# Patient Record
Sex: FEMALE | Race: WHITE | Employment: UNEMPLOYED | URBAN - METROPOLITAN AREA
[De-identification: names, ages, dates, MRNs, and addresses within clinical notes are randomized per-mention and may not be internally consistent; named-entity substitution may affect disease eponyms.]

---

## 2017-02-20 ENCOUNTER — OFFICE VISIT (OUTPATIENT)
Dept: ENDOCRINOLOGY | Age: 37
End: 2017-02-20

## 2017-02-20 VITALS
RESPIRATION RATE: 16 BRPM | DIASTOLIC BLOOD PRESSURE: 78 MMHG | SYSTOLIC BLOOD PRESSURE: 104 MMHG | BODY MASS INDEX: 20.91 KG/M2 | HEART RATE: 73 BPM | WEIGHT: 118 LBS | HEIGHT: 63 IN | OXYGEN SATURATION: 98 %

## 2017-02-20 DIAGNOSIS — Z86.32 HISTORY OF GESTATIONAL DIABETES: ICD-10-CM

## 2017-02-20 DIAGNOSIS — E06.3 HYPOTHYROIDISM DUE TO HASHIMOTO'S THYROIDITIS: ICD-10-CM

## 2017-02-20 DIAGNOSIS — L65.9 HAIR LOSS: Primary | ICD-10-CM

## 2017-02-20 DIAGNOSIS — E03.8 HYPOTHYROIDISM DUE TO HASHIMOTO'S THYROIDITIS: ICD-10-CM

## 2017-02-20 RX ORDER — CHOLECALCIFEROL (VITAMIN D3) 125 MCG
CAPSULE ORAL
COMMUNITY

## 2017-02-20 NOTE — PATIENT INSTRUCTIONS
I recommend researching a low-carbohydrate diet as this way of eating can help improve your blood sugars and also help you lose weight. It can also help decrease your needs for diabetes medications including insulin. Here are some resources you can use to educate yourself on the diet:  1. Diet Doctor Website:   PickSeatGRID/diabetes   https://[employer].InteKrin/  2. A Low Carbohydrate, Ketogenic Diet Manual by Anny Licea  3. New Atkins for a New You by Anny Licea and Cindy Escobedo  4. [email or web address]/blog/7-steps-to-healthy-low-carb-living/    ==================================================================     AIM FOR LESS THAN 20-30 GRAMS OF NET CARBS PER MEAL  Net carbs = total carbs (g) - fiber (g)  Read food labels! Avoid food with added sugar or anything with more than 5g sugar per serving. Focus on eating mostly protein (meat, poultry, fish, shellfish, eggs), healthy fats (avocados, nuts, cheese, olive or coconut oil) and non-starchy vegetables (greens, carrots, tomatoes, bell peppers, broccoli, brussels sprouts, green beans, etc). If you fill yourself up with these foods, you won't even want the carbs. Minimize your fruit intake as much as possible, no more than one serving per day.  When you do eat fruit, choose lower sugar options like fresh berries.     BREAKFAST: whole eggs, veggies, omelet, plain yogurt, bui, sausage   LUNCH: salad with meat/poultry, veggies, cheese, nuts; low carb wrap with veggies and meat  DINNER: any type of meat/poultry or seafood (without breading), non-starchy vegetables, cauliflower rice, zucchini noodles     GOOD LOW-CARB SNACK OPTIONS: string cheese, Babybel cheese, carrots and celery with Ranch dressing, nuts (almonds, pistachios, walnuts, etc), meat (snack size salami, ham, or turkey)    BEVERAGES: water, water, water  Other options: unsweet tea (okay to add a pack of Splenda if needed), sparkling water without calories (ex: [employer], [place], etc), coffee (unsweeted creamer is fine)  Alcohol: (always in moderation) - lower carb options include red or white wine and champagne (the drier, the better); light beers like UmbaBoxelOptyn Ultra; some liquors (just avoid the sweet mixers like juices and sodas)

## 2017-02-20 NOTE — MR AVS SNAPSHOT
Visit Information Date & Time Provider Department Dept. Phone Encounter #  
 2/20/2017  9:30 AM Teresa Molina, [address] [place] [address] Diabetes and Endocrinology [id number] Follow-up Instructions Return if symptoms worsen or fail to improve. Upcoming Health Maintenance Date Due INFLUENZA AGE 9 TO ADULT 8/1/2016 PAP AKA CERVICAL CYTOLOGY 5/20/2018 DTaP/Tdap/Td series (2 - Td) 7/1/2022 Allergies as of 2/20/2017  Review Complete On: 2/20/2017 By: Day Villatoro Severity Noted Reaction Type Reactions Penicillins  11/15/2011    Hives Not all the Penicillins just Penicillin only Current Immunizations  Reviewed on 11/21/2013 Name Date Influenza Vaccine 11/21/2013 Influenza Vaccine Split 11/15/2011 TDAP Vaccine 7/1/2012  3:15 PM  
  
 Not reviewed this visit You Were Diagnosed With   
  
 Codes Comments Hair loss    -  Primary ICD-10-CM: L65.9 ICD-9-CM: 704.00 History of gestational diabetes     ICD-10-CM: Z86.32 
ICD-9-CM: V12.21 Vitals BP Pulse Resp Height(growth percentile) Weight(growth percentile) SpO2  
 104/78 73 16 5' 3\" (1.6 m) 118 lb (53.5 kg) 98% BMI OB Status Smoking Status 20.9 kg/m2 Having regular periods Never Smoker Vitals History BMI and BSA Data Body Mass Index Body Surface Area  
 20.9 kg/m 2 1.54 m 2 Preferred Pharmacy Pharmacy Name Phone [id number] [address]  [phone] Your Updated Medication List  
  
   
This list is accurate as of: 2/20/17 10:03 AM.  Always use your most recent med list.  
  
  
  
  
 Biotin 2,500 mcg Cap Take  by mouth.  
  
 eflornithine 13.9 % topical cream  
Commonly known as:  [employer] Apply  to affected area two (2) times a day. apply thin layer to affected areas, space application by 8 hours  
  
 ergocalciferol 50,000 unit capsule Commonly known as:  ERGOCALCIFEROL Take 1 Cap by mouth every seven (7) days. ferrous sulfate 325 mg (65 mg iron) Cper Take  by mouth. ibuprofen 800 mg tablet Commonly known as:  MOTRIN Take 1 Tab by mouth every eight (8) hours. oxyCODONE-acetaminophen 5-325 mg per tablet Commonly known as:  PERCOCET Take 1 Tab by mouth every four (4) hours as needed. PRENATAL VITAMIN 27-0.8 mg Tab tablet Generic drug:  prenatal vit-iron fumarate-fa Take 1 Tab by mouth daily. tranexamic acid 650 mg Tab tablet Commonly known as:  LYSTEDA Take 1 Tab by mouth three (3) times daily. VITAMIN D3 2,000 unit Tab Generic drug:  cholecalciferol (vitamin D3) Take  by mouth. We Performed the Following HEMOGLOBIN A1C WITH EAG [22085 CPT(R)] TSH AND FREE T4 [38402 CPT(R)] VITAMIN D, 25 HYDROXY [id number] CPT(R)] Follow-up Instructions Return if symptoms worsen or fail to improve. Patient Instructions I recommend researching a low-carbohydrate diet as this way of eating can help improve your blood sugars and also help you lose weight. It can also help decrease your needs for diabetes medications including insulin. Here are some resources you can use to educate yourself on the diet: 1. Diet Doctor Website: 
 [employer]/diabetes 
 https://Sun Number.org/ 2. A Low Carbohydrate, Ketogenic Diet Manual by Jess Salvador 3. New Atkins for a New You by Jess Salvador and Meenakshi Frias 4. http://Voyando.[employer]QUAL/blog/7-steps-to-healthy-low-carb-living/ 
 
================================================================== 
  
AIM FOR LESS THAN 20-30 GRAMS OF NET CARBS PER MEAL Net carbs = total carbs (g) - fiber (g) Read food labels! Avoid food with added sugar or anything with more than 5g sugar per serving.  
 
Focus on eating mostly protein (meat, poultry, fish, shellfish, eggs), healthy fats (avocados, nuts, cheese, olive or coconut oil) and non-starchy vegetables (greens, carrots, tomatoes, bell peppers, broccoli, brussels sprouts, green beans, etc). If you fill yourself up with these foods, you won't even want the carbs. Minimize your fruit intake as much as possible, no more than one serving per day. When you do eat fruit, choose lower sugar options like fresh berries. 
  
BREAKFAST: whole eggs, veggies, omelet, plain yogurt, bui, sausage LUNCH: salad with meat/poultry, veggies, cheese, nuts; low carb wrap with veggies and meat DINNER: any type of meat/poultry or seafood (without breading), non-starchy vegetables, cauliflower rice, zucchini noodles 
  
GOOD LOW-CARB SNACK OPTIONS: string cheese, Babybel cheese, carrots and celery with Ranch dressing, nuts (almonds, pistachios, walnuts, etc), meat (snack size salami, ham, or turkey) BEVERAGES: water, water, water Other options: unsweet tea (okay to add a pack of Splenda if needed), sparkling water without calories (ex: [place], [place], etc), coffee (unsweeted creamer is fine) Alcohol: (always in moderation) - lower carb options include red or white wine and champagne (the drier, the better); light beers like Michelob Ultra; some liquors (just avoid the sweet mixers like juices and sodas) Introducing \[facility]\"" & HEALTH SERVICES! Dear Wing Da Silva: Thank you for requesting a Digital Vega account. Our records indicate that you already have an active Digital Vega account. You can access your account anytime at [email or web address] Did you know that you can access your hospital and ER discharge instructions at any time in Digital Vega? You can also review all of your test results from your hospital stay or ER visit. Additional Information If you have questions, please visit the Frequently Asked Questions section of the Digital Vega website at [email or web address] Remember, Digital Vega is NOT to be used for urgent needs. For medical emergencies, dial 911. Now available from your iPhone and Android! Please provide this summary of care documentation to your next provider. Your primary care clinician is listed as [address] [place] [address] Street. If you have any questions after today's visit, please call [phone].

## 2017-02-20 NOTE — PROGRESS NOTES
Endocrinology Visit    Chief Complaint: hair loss    History of Present Illness:  Rober Tim is a 31 y.o. female who returns for post-partum hair loss. I saw her in initial consultation in September 2016 at which time a work-up was initiated: thyroid function tests, testosterone, ferritin all returned WNL. She did have an elevated TPO antibody. In the interim, she reports that her hair is growing back and is not falling out excessively like it was last year. She started taking biotin, vitamin D, and iron since I saw her last. Continues taking prenatal vitamins although she is not interested in pursuing fertility at this time. Weight is fairly stable, down 4 lbs since her last visit. She is concerned about her diet over the holidays because she indulged in homemade cookies and chocolate covered potato chips. Given her h/o GDM and her family history of diabetes, she wants to make sure she does not have prediabetes. A1c was 5.5% when checked last Sept.    She is having regular cycles but is not on any contraceptive method due to fear of side effects (blood clots and weight gain). Menarche was at age 17, cycles were somewhat irregular - every 4-5 weeks until she had her first child. Also has some abnormal hair growth on her chin, plucks 40-50 hairs per day. This improved during her pregnancy. Shavon Aguirre has helped in the past but it is too expensive. She had GDM with her second pregnancy, not on medication - just treated with diet. She has been exercising more frequently, 5 days/week and working with a . She has two children and the youngest has been sick often with respiratory illnesses - admits this causes a significant amount of stress. She denies hot flashes, palpitations, tremors, anxiety attacks. Also denies heat or cold intolerance or changes in bowel habits. Review of Systems:  Pertinent positives and negatives noted in HPI. Otherwise a 7 pt ROS was negative.     Problem List:  Patient Active Problem List   Diagnosis Code    History of kidney stones X76.707    Renal colic on left side V87    Ureteral stone N20.1    History of gestational diabetes Z86.32    Hair loss L65.9       Past Medical History:    Past Medical History   Diagnosis Date    Abnormal Pap smear 2005     Abnormal pap; Normal bx with colpo    Diabetes ([address]     Hair loss      For last 6 months    HX OTHER MEDICAL      migraines without aura    Kidney disease 2003     Kidney stones; lithrotripsy 2003: Scan clear in 4/2011    Kidney stones        Past Surgical History:  Past Surgical History   Procedure Laterality Date    Hx wisdom teeth extraction  2002    Hx lithotripsy  2004       Social History:  Social History     Social History    Marital status:      Spouse name: Cheng Borne Number of children: 0    Years of education: N/A     Occupational History     [place]     Social History Main Topics    Smoking status: Never Smoker    Smokeless tobacco: Never Used    Alcohol use No    Drug use: No    Sexual activity: Yes     Partners: Male     Birth control/ protection: Condom     Other Topics Concern    Not on file     Social History Narrative       Family History:  Family History   Problem Relation Age of Onset    Diabetes Father     Hypertension Father     Heart Disease Mother     Diabetes Mother 27     Gestational DM; No recurrent DM as of yet    Diabetes Maternal Grandmother     Cancer Maternal Grandmother 80     Breast    Dementia Maternal Grandmother      Sundowners    Diabetes Paternal Grandmother     Thyroid Disease Neg Hx        Medications:     Current Outpatient Prescriptions:     cholecalciferol, vitamin D3, 2,000 unit tab, Take  by mouth., Disp: , Rfl:     ferrous sulfate 325 mg (65 mg iron) cpER, Take  by mouth., Disp: , Rfl:     Biotin 2,500 mcg cap, Take  by mouth., Disp: , Rfl:     eflornithine (VANIQA) 13.9 % topical cream, Apply  to affected area two (2) times a day.  apply thin layer to affected areas, space application by 8 hours, Disp: 30 g, Rfl: 3    ergocalciferol (ERGOCALCIFEROL) 50,000 unit capsule, Take 1 Cap by mouth every seven (7) days. , Disp: 8 Cap, Rfl: 0    tranexamic acid (LYSTEDA) 650 mg tab tablet, Take 1 Tab by mouth three (3) times daily. , Disp: 30 Tab, Rfl: 6    ibuprofen (MOTRIN) 800 mg tablet, Take 1 Tab by mouth every eight (8) hours. , Disp: 30 Tab, Rfl: 1    oxyCODONE-acetaminophen (PERCOCET) 5-325 mg per tablet, Take 1 Tab by mouth every four (4) hours as needed. , Disp: 20 Tab, Rfl: 0    prenatal vit-iron fumarate-fa (PRENATAL VITAMIN) 27-0.8 mg Tab tablet, Take 1 Tab by mouth daily. , Disp: , Rfl:     Allergies: Allergies   Allergen Reactions    Penicillins Hives     Not all the Penicillins just Penicillin only       Physical Examination:  Visit Vitals    Ht 5' 3\" (1.6 m)    Wt 118 lb (53.5 kg)    BMI 20.9 kg/m2     Gen: no acute distress  HEENT: mucous membranes moist, short terminal hairs noted on chin  Neck: no acanthosis nigricans, acne in various stages on proximal portion of her neck  Thyroid: no enlargement or nodules noted  CAD: normal rate, regular rhythm. No murmur rubs or gallops  PULM: clear to ausculation, no wheezes, rhonchis or rales.   EXT: no clubbing, cyanosis or edema  Neuro: grossly non focal, normal DTRs, no tremor of outstretched hands  Scalp: curly short hairs noted along facial hairline, shorter hair growing diffusely (apprx 3 in in length)  Psych: calm, cooperative, good insight into medical history    Clinical Data Review:  Component      Latest Ref Rng & Units 8/29/2016 8/29/2016 8/29/2016          12:24 PM 12:24 PM 12:24 PM   TSH      0.450 - 4.500 uIU/mL   1.480   T4, Free      0.82 - 1.77 ng/dL   1.09   Thyroid peroxidase Ab      0 - 34 IU/mL 112 (H)     Thyroglobulin Ab      0.0 - 0.9 IU/mL <1.0     Triiodothyronine (T3), free      2.0 - 4.4 pg/mL  3.0      Component      Latest Ref Rng 6/1/2016 3/25/2016           8:22 AM 2:27 PM   TSH      0.450 - 4.500 uIU/mL 1.080 1.680       Component      Latest Ref Rng 8/29/2016 8/29/2016 8/29/2016          12:24 PM 12:24 PM 12:24 PM   Testosterone      ng/dL      % Free testosterone      %      Free testosterone (Direct)      pg/mL      SEX HORMONE BINDING GLOBULIN      nmol/L      Thyroid peroxidase Ab      0 - 34 IU/mL   112 (H)   Thyroglobulin Ab      0.0 - 0.9 IU/mL   <1.0   Hemoglobin A1c, (calculated)      4.8 - 5.6 %  5.5    Estimated average glucose      mg/dL  111    Ferritin      15 - 150 ng/mL 61       Component      Latest Ref Rng 8/29/2016          12:24 PM   Testosterone      ng/dL 27   % Free testosterone      % 0.9   Free testosterone (Direct)      pg/mL 2.4   SEX HORMONE BINDING GLOBULIN      nmol/L 60.8   Thyroid peroxidase Ab      0 - 34 IU/mL    Thyroglobulin Ab      0.0 - 0.9 IU/mL    Hemoglobin A1c, (calculated)      4.8 - 5.6 %    Estimated average glucose      mg/dL    Ferritin      15 - 150 ng/mL      Assessment and Plan:  Patient is a 31y.o. year old female here for f/u of acute onset of diffuse post-partum hair loss. This appears to be resolving per exam, and may also coincide with her cessation of breastfeeding (and return of normal menses, estrogen-progesterone cycle). The pattern of hair loss does not follow what I would expect for androgenic alopecia; rather fits more with telogen effluvium which I suspect could be due from the stress she experienced with her second child. In the absence of other hormonal abnormalities after the work-up above, I presume that her hair loss is most-likely the result of post-partum stress and hormonal changes with breastfeeding. Fortunately it appears to be resolving. She is clinically euthyroid but will recheck TSH along with FT4 to ensure thyroid function remains normal. Given TPO antibody positivity, I would recommend TSH and FT4 be checked at least annually going forward.  Will also check A1c today given her h/o GDM and concern about developing prediabetes due to recent high carbohydrate snacking. F/u vitamin D level as well. Patient verbalized an understanding and will return to clinic as needed. I spent 25 minutes with the patient today obtaining history and counseling on lifestyle modification for preventing diabetes. Thank you for the opportunity to participate in this patient's care. Rosa Flores MD  [facility]  [address] [facility]    ------------------------------------------------------------------------  [facility] 2/21/17:    Labs are now consistent with hypothyroidism. Called pt and sent results through [address] Starting levothyroxine 75mcg daily (wt-based) and she will return in 2 months for f/u labs and a visit with me.     Lab Results   Component Value Date/Time    TSH 8.640 02/20/2017 10:28 AM                T4, Free 0.79 02/20/2017 10:28 AM     Lab Results   Component Value Date/Time    Hemoglobin A1c 5.5 02/20/2017 10:28 AM     Lab Results   Component Value Date/Time    VITAMIN D, 25-HYDROXY 44.0 02/20/2017 10:28 AM

## 2017-02-21 PROBLEM — E03.8 HYPOTHYROIDISM DUE TO HASHIMOTO'S THYROIDITIS: Status: ACTIVE | Noted: 2017-02-21

## 2017-02-21 PROBLEM — E06.3 HYPOTHYROIDISM DUE TO HASHIMOTO'S THYROIDITIS: Status: ACTIVE | Noted: 2017-02-21

## 2017-02-21 LAB
25(OH)D3+25(OH)D2 SERPL-MCNC: 44 NG/ML (ref 30–100)
EST. AVERAGE GLUCOSE BLD GHB EST-MCNC: 111 MG/DL
HBA1C MFR BLD: 5.5 % (ref 4.8–5.6)
T4 FREE SERPL-MCNC: 0.79 NG/DL (ref 0.82–1.77)
TSH SERPL DL<=0.005 MIU/L-ACNC: 8.64 UIU/ML (ref 0.45–4.5)

## 2017-02-21 RX ORDER — LEVOTHYROXINE SODIUM 75 UG/1
75 TABLET ORAL
Qty: 30 TAB | Refills: 4 | Status: SHIPPED | OUTPATIENT
Start: 2017-02-21

## 2017-02-23 ENCOUNTER — TELEPHONE (OUTPATIENT)
Dept: ENDOCRINOLOGY | Age: 37
End: 2017-02-23

## 2017-02-23 NOTE — TELEPHONE ENCOUNTER
Called and left a message for pt to call the office regarding her headaches and synthroid  medication

## 2017-02-23 NOTE — TELEPHONE ENCOUNTER
Pt stated that since starting the new medication for her thyroid she has had headaches. Pt stated that she normally gets headache during or after her cycle and her cycle ended Friday so she is not sure if the headaches are due to the medication (which she has only been on for a few days) or from coming off of her cycle. Pt would like to know if taking otc medication is ok and if it is, what do you recommend. Pt stated that she normally takes Excedrin. Please advise.

## 2017-02-23 NOTE — TELEPHONE ENCOUNTER
----- Message from Sammie Lawson sent at 2/23/2017 12:03 PM EST -----  Regarding: headache  Contact: [phone]  2/23/2017  12:03 PM      Ms. Cortez Mantle called stating that she have a headache from taking the new medication Dr. Lea Reyes prescribed her, please call [phone] when you have a moment.       Thanks

## 2017-04-21 DIAGNOSIS — E06.3 HYPOTHYROIDISM DUE TO HASHIMOTO'S THYROIDITIS: ICD-10-CM

## 2017-04-21 DIAGNOSIS — E03.8 HYPOTHYROIDISM DUE TO HASHIMOTO'S THYROIDITIS: ICD-10-CM

## 2017-04-21 DIAGNOSIS — L65.9 HAIR LOSS: ICD-10-CM

## 2017-10-12 ENCOUNTER — OFFICE VISIT (OUTPATIENT)
Dept: MIDWIFE SERVICES | Age: 37
End: 2017-10-12

## 2017-10-12 VITALS
WEIGHT: 118 LBS | HEART RATE: 74 BPM | BODY MASS INDEX: 20.91 KG/M2 | DIASTOLIC BLOOD PRESSURE: 85 MMHG | HEIGHT: 63 IN | SYSTOLIC BLOOD PRESSURE: 125 MMHG

## 2017-10-12 DIAGNOSIS — Z01.419 WELL WOMAN EXAM WITH ROUTINE GYNECOLOGICAL EXAM: ICD-10-CM

## 2017-10-12 DIAGNOSIS — Z20.2 EXPOSURE TO SEXUALLY TRANSMITTED DISEASE (STD): Primary | ICD-10-CM

## 2017-10-12 DIAGNOSIS — F41.8 ANXIETY ABOUT HEALTH: ICD-10-CM

## 2017-10-12 NOTE — PROGRESS NOTES
Chief Complaint   Patient presents with    Well Woman     Visit Vitals    /85    Pulse 74    Ht 5' 3\" (1.6 m)    Wt 118 lb (53.5 kg)    LMP 09/22/2017 (Exact Date)    Breastfeeding No    BMI 20.9 kg/m2     1. Have you been to the ER, urgent care clinic since your last visit? Hospitalized since your last visit? No    2. Have you seen or consulted any other health care providers outside of the [address] [place] [address] since your last visit? Include any pap smears or colon screening.  Yes Endocrinology    Verbal order for Nuswab for STI per FELICIA Major CNM

## 2017-10-12 NOTE — MR AVS SNAPSHOT
Visit Information Date & Time Provider Department Dept. Phone Encounter #  
 10/12/2017 11:00 AM Zelalem Cota [id number] Upcoming Health Maintenance Date Due INFLUENZA AGE 9 TO ADULT 8/1/2017 PAP AKA CERVICAL CYTOLOGY 5/20/2018 Allergies as of 10/12/2017  Review Complete On: 10/12/2017 By: Darrin Kc LPN Severity Noted Reaction Type Reactions Penicillins  11/15/2011    Hives Not all the Penicillins just Penicillin only Current Immunizations  Reviewed on 11/21/2013 Name Date Influenza Vaccine 11/21/2013 Influenza Vaccine Split 11/15/2011 TDAP Vaccine 7/1/2012  3:15 PM  
  
 Not reviewed this visit Vitals BP Pulse Height(growth percentile) Weight(growth percentile) LMP Breastfeeding? 125/85 74 5' 3\" (1.6 m) 118 lb (53.5 kg) 09/22/2017 (Exact Date) No  
 BMI OB Status Smoking Status 20.9 kg/m2 Having regular periods Never Smoker BMI and BSA Data Body Mass Index Body Surface Area  
 20.9 kg/m 2 1.54 m 2 Preferred Pharmacy Pharmacy Name Phone [id number] [phone] [facility], [address] [place] [address]  [phone] Your Updated Medication List  
  
   
This list is accurate as of: 10/12/17 11:58 AM.  Always use your most recent med list.  
  
  
  
  
 Biotin 2,500 mcg Cap Take  by mouth.  
  
 eflornithine 13.9 % topical cream  
Commonly known as:  PersistIQ Apply  to affected area two (2) times a day. apply thin layer to affected areas, space application by 8 hours  
  
 ergocalciferol 50,000 unit capsule Commonly known as:  ERGOCALCIFEROL Take 1 Cap by mouth every seven (7) days. ferrous sulfate 325 mg (65 mg iron) Cper Take  by mouth. ibuprofen 800 mg tablet Commonly known as:  MOTRIN Take 1 Tab by mouth every eight (8) hours. levothyroxine 75 mcg tablet Commonly known as:  SYNTHROID  
 Take 1 Tab by mouth Daily (before breakfast). oxyCODONE-acetaminophen 5-325 mg per tablet Commonly known as:  PERCOCET Take 1 Tab by mouth every four (4) hours as needed. PRENATAL VITAMIN 27 mg iron- 0.8 mg Tab tablet Generic drug:  prenatal vit-iron fumarate-fa Take 1 Tab by mouth daily. tranexamic acid 650 mg Tab tablet Commonly known as:  LYSTEDA Take 1 Tab by mouth three (3) times daily. VITAMIN D3 2,000 unit Tab Generic drug:  cholecalciferol (vitamin D3) Take  by mouth. Patient Instructions Calcium Content of Foods Dairy and Soy Amount  Calcium (mg) Milk (skim, low fat, whole) 1 cup 300 Buttermilk 1 cup 300 Cottage Cheese 0.5 cup 65 Ice Cream or Ice Milk 0.5 cup 100 Sour Cream, cultured 1 cup 250 Soy Milk, calcium fortified 1 cup 200 to 400 Yogurt 1 cup 450 Yogurt drink 12 oz 300 [place] Instant Breakfast  1 packet 250 Hot Cocoa, calcium fortified 1 packet 320 Nonfat dry milk powder 5 Tbsp 300 Brie Cheese 1 oz 50 Hard Cheese (cheddar, jack)  1 oz 200 Mozzarella 1 oz 200 Parmesan Cheese 1 Tbsp 70 Swiss or Gruyere 1 oz 270 Vegetables [place] squash, cooked 1 cup  90 Arugula, raw 1 cup 125 Bok Nasreen, raw 1 cup 40 Broccoli, cooked 1 cup 180 Chard or Okra, cooked 1 cup 100 Chicory (curly endive), raw  1 cup 40 Beth greens 1 cup 50 Corn, brine packed 1 cup 10 Dandelion greens, raw 1 cup 80 Kale, raw 1 cup 55 Kelp or Kombe 1 cup 60 Mustard greens 1 cup 40 Spinach, cooked 1 cup 240 Turnip greens, raw 1 cup 80 Fruits Figs, dried, uncooked 1 cup  300 Kiwi, raw 1 cup 50 Orange juice, calcium fortified 8 oz 300 Orange juice, from concentrate  1 cup 20 Legumes Garbanzo Beans, cooked 1 cup 80 Legumes, general, cooked  0.5 cup 15 to 50 Montes Beans, cooked 1 cup 75 Soybeans, boiled 0.5 cup  100 Temphe 0.5 cup 75 Tofu, firm, calcium set 4 oz  250 to 750 Tofu, soft regular 4 oz 120 to 390 White Beans, cooked 0.5 cup 70 Grains Cereals (calcium fortified)  0.5 to 1 cup  250 to 1000 Amaranth, cooked 0.5 cup 135 Bread, calcium fortified 1 slice  812 to 464 Brown rice, long grain, raw  1 cup 50 Oatmeal, instant 1 package 100 to 150 Tortillas, corn 2 85 Nuts and Seeds Almonds, toasted unblanched 1 oz. 80  
Sesame seeds, whole roasted  1 oz. [facility] Sesame tahini 1 oz. (2 Tbsp)  130 Sunflower seeds, dried 1 oz. 48 Fish 
Mackerel, canned  3 oz. [facility] [place], canned, with bones  3 oz. 170 to 210 Sardines 3 oz. 370 Other Molasses, blackstrap  1 Tbsp  135 * When range is given, calcium content varies by product. * The calcium content of plant foods is varied. Most vegetables, legumes, nuts, seeds, and dried fruit contain some calcium. Listed are selected significant sources of well-absorbed calcium. References: 
[employer], Handbook 8 palm program  
Devorah Valdes [address] [facility] For a great variety of information on heart disease prevention for women, check out  
 
http://tino.info/ Thank you for choosing [facility]. We know you have many options when it comes to your healthcare; we appreciate that you picked us. Our goal is to provide exceptional service and world class care for every patient. You may receive a survey in the mail or by email asking for your feedback. Please take a few minutes to share your thoughts about your recent visit. Your comments helps us understand what we do well and what we can do better. To ensure confidentiality, this survey is administered by an independent third-party, [address] [place] [address] participation will help us to continue and improve the quality of care that we provide to you, your family, friends, and neighbors. Thank you! Introducing [facility] HE[facility]! Dear Candido Burrell: Thank you for requesting a [facility] [employer] account. Our records indicate that you already have an active [employer] account. You can access your account anytime at [email or web address] Did you know that you can access your hospital and ER discharge instructions at any time in [employer]? You can also review all of your test results from your hospital stay or ER visit. Additional Information If you have questions, please visit the Frequently Asked Questions section of the [employer] website at [email or web address] Remember, [facility] is NOT to be used for urgent needs. For medical emergencies, dial 911. Now available from your iPhone and Android! Please provide this summary of care documentation to your next provider. Your primary care clinician is listed as Jose Tomlinson. If you have any questions after today's visit, please call [phone].

## 2017-10-12 NOTE — PATIENT INSTRUCTIONS
Calcium Content of Foods  Dairy and Soy Amount  Calcium (mg)    Milk (skim, low fat, whole) 1 cup 300    Buttermilk 1 cup 300   Cottage Cheese 0.5 cup 65   Ice Cream or Ice Milk 0.5 cup 100   Sour Cream, cultured 1 cup 250   Soy Milk, calcium fortified 1 cup 200 to 400   Yogurt 1 cup 450   Yogurt drink 12 oz 300   [place] Instant Breakfast  1 packet 250   Hot Cocoa, calcium fortified 1 packet 320   Nonfat dry milk powder 5 Tbsp 300   Brie Cheese 1 oz 50   Hard Cheese (cheddar, mark)  1 oz 200   Mozzarella 1 oz 200   Parmesan Cheese 1 Tbsp 70   Swiss or Gruyere 1 oz 270   Vegetables  [place] squash, cooked 1 cup  90    Arugula, raw 1 cup 125   Bok Nasreen, raw 1 cup 40   Broccoli, cooked 1 cup 180   Chard or Okra, cooked 1 cup 100   Chicory (curly endive), raw  1 cup 40   Beth greens 1 cup 50   Corn, brine packed 1 cup 10   Dandelion greens, raw 1 cup 80   Kale, raw 1 cup 55   Kelp or Kombe 1 cup 60   Mustard greens 1 cup 40   Spinach, cooked 1 cup 240   Turnip greens, raw 1 cup 80   Fruits  Figs, dried, uncooked 1 cup  300    Kiwi, raw 1 cup 50   Orange juice, calcium fortified 8 oz 300   Orange juice, from concentrate  1 cup 20   Legumes  Garbanzo Beans, cooked 1 cup 80    Legumes, general, cooked  0.5 cup 15 to 50   Montes Beans, cooked 1 cup 75   Soybeans, boiled 0.5 cup  100   Temphe 0.5 cup 75   Tofu, firm, calcium set 4 oz  250 to 750    Tofu, soft regular 4 oz 120 to 390   White Beans, cooked 0.5 cup 70   Grains  Cereals (calcium fortified)  0.5 to 1 cup  250 to 1000    Amaranth, cooked 0.5 cup 135   Bread, calcium fortified 1 slice  278 to 197   Brown rice, long grain, raw  1 cup 50   Oatmeal, instant 1 package 100 to 150   Tortillas, corn 2 85   Nuts and Seeds  Almonds, toasted unblanched 1 oz. 80   Sesame seeds, whole roasted  1 oz. 280   Sesame tahini 1 oz. (2 Tbsp)  130    Sunflower seeds, dried 1 oz. [address], [place], canned  3 oz. 250   [place], canned, with bones  3 oz. 170 to 210    Sardines 3 oz. 370   Other  Molasses, blackstrap  1 Tbsp  135    * When range is given, calcium content varies by product. * The calcium content of plant foods is varied. Most vegetables, legumes, nuts, seeds, and dried fruit contain some calcium. Listed are selected significant sources of well-absorbed calcium. References:  [employer], Handbook 8 palm program   Su and [address]  For a great variety of information on heart disease prevention for women, check out     http://tino.info/    Thank you for choosing [facility]. We know you have many options when it comes to your healthcare; we appreciate that you picked us. Our goal is to provide exceptional service and world class care for every patient. You may receive a survey in the mail or by email asking for your feedback. Please take a few minutes to share your thoughts about your recent visit. Your comments helps us understand what we do well and what we can do better. To ensure confidentiality, this survey is administered by an independent third-party, [address] [place] [address] participation will help us to continue and improve the quality of care that we provide to you, your family, friends, and neighbors. Thank you!

## 2017-10-13 NOTE — PROGRESS NOTES
SUBJECTIVE:   30 y.o. female for annual routine Pap and checkup. Kirstin Melgar has been a patient at the practice and had 2 births with the practice. She denies any history of post partum depression. She was diagnosed with new onset hypothyroid in February 2017 and is due to see her endocrinologist next week. Since that time she reports feeling increasing anxiety and nervousness out of proportion to the events. She has become increasing obsessed about things, which she logically knows are minor, but she cannot \"let them go. \"  She states that people she has confided in have encouraged there to reach out for help. We reviewed options and she is not against medication for anxiety and/or panic attacks but would first like to try counseling and have appointment with endocrinologists and have hormone levels evaluated and thyroid level checked. She denies feelings of harm to self or others. We did review suicide precautions and I did encourage her to put the suicide prevention hot line number in her phone and have it just in case she starts having thoughts. She did say at times she has unrealistic thoughts that she need STD testing and requesting it, but does not want blood testing, only for GC and CT, although she logically knows it is not necessary. Current Outpatient Prescriptions   Medication Sig Dispense Refill    levothyroxine (SYNTHROID) 75 mcg tablet Take 1 Tab by mouth Daily (before breakfast). 30 Tab 4    cholecalciferol, vitamin D3, (VITAMIN D3) 2,000 unit tab Take  by mouth.  ferrous sulfate 325 mg (65 mg iron) cpER Take  by mouth.  Biotin 2,500 mcg cap Take  by mouth.  prenatal vit-iron fumarate-fa (PRENATAL VITAMIN) 27-0.8 mg Tab tablet Take 1 Tab by mouth daily. Allergies: Penicillins   Patient's last menstrual period was 09/22/2017 (exact date). ROS:  Complete ROS completed, see second page of personal health questionnaire and scanned in media tab. Feeling well.  No dyspnea or chest pain on exertion. No abdominal pain, change in bowel habits, black or bloody stools. No urinary tract symptoms. GYN ROS: normal menses, no abnormal bleeding, pelvic pain or discharge, no breast pain or new or enlarging lumps on self exam. No neurological complaints. Menses monthly. Average interval between menses 32-35 days. Menses last average 7 days. Flow is  heavy. No bleeding between menses, or after intercourse. Experiences some dysmenorrhea. Personal Health Questionnaire Reviewed: yes. Please see under chart review, media tab. History fully reviewed- see chart    See patient intake form for complete ROS, scanned in pt chart, under media tab. Age 5 to 31 yo, received HPV vaccine series, n/a.    USPFT recommendations: reviewed    BMI 30 kg/m2 or greater no. College bound no. Childbearing age yes. Hyperlipidemia or other known risk factors for cardiovascular and diet-related chronic disease no. Born between [facility] no. Age 8 to 26 yo and fair-skinned no. Age 15 to 26 yo no. Age 25 or older: yes. Over the past 2 weeks, have you felt down, depressed, or hopeless? yes  Over the past 2 weeks, have you felt little interest or pleasure in doing things? no  Age 22 yo or older and at risk for coronary heart disease no. Age 35 yo or older no. Age 47 yo or older no. Joao 55 yo to 77 yo no. Age 73 yo or older no. Family history of Breast Cancer yes, in 80year old grandmother. Sexually active age up to age 26 yo or at high risk for STI no.  Sustained /80 or greater no. Tobacco use no. OBJECTIVE:   The patient appears well, alert, oriented x 3, in no distress. Visit Vitals    /85    Pulse 74    Ht 5' 3\" (1.6 m)    Wt 118 lb (53.5 kg)    LMP 09/22/2017 (Exact Date)    Breastfeeding No    BMI 20.9 kg/m2     Neck supple. No adenopathy or thyromegaly. Lungs are clear, good air entry, no wheezes, rhonchi or rales. S1 and S2 normal, no murmurs, regular rate and rhythm. Abdomen soft without tenderness, guarding, mass or organomegaly. Extremities show no edema. Neurological is normal, no focal findings. BREAST EXAM: breasts appear normal, no suspicious masses, no skin or nipple changes or axillary nodes    PELVIC EXAM: normal external genitalia, vulva, vagina, cervix, uterus and adnexa   Pap smear collected    ASSESSMENT:   well woman  Nuswab collected per patient request    PLAN:  Counseled on anxiety. Information given to [facility] with Dio Cole LCSW   Therapist.  Pt instructed on heart health for women, calcium and vitamin D intake, and female cancers; written materials given to pt  Return annually or prn  Encouraged to follow up with me after appointment with Endo to discuss options for medication PRN or with PCP  Nuswab ordered for GC/CT/Trich. Support given, Will inquire about counseling when call with results.

## 2017-10-15 LAB
C TRACH RRNA SPEC QL NAA+PROBE: NEGATIVE
HSV1 DNA SPEC QL NAA+PROBE: NEGATIVE
HSV2 DNA SPEC QL NAA+PROBE: NEGATIVE
N GONORRHOEA RRNA SPEC QL NAA+PROBE: NEGATIVE
T VAGINALIS RRNA SPEC QL NAA+PROBE: NEGATIVE

## 2020-11-18 ENCOUNTER — INITIAL PRENATAL (OUTPATIENT)
Dept: OBGYN CLINIC | Age: 40
End: 2020-11-18
Payer: COMMERCIAL

## 2020-11-18 VITALS
BODY MASS INDEX: 20.73 KG/M2 | SYSTOLIC BLOOD PRESSURE: 122 MMHG | HEIGHT: 63 IN | DIASTOLIC BLOOD PRESSURE: 64 MMHG | WEIGHT: 117 LBS

## 2020-11-18 DIAGNOSIS — E06.3 HASHIMOTO'S DISEASE: ICD-10-CM

## 2020-11-18 DIAGNOSIS — Z87.51 HISTORY OF PREMATURE DELIVERY: ICD-10-CM

## 2020-11-18 DIAGNOSIS — Z87.442 HISTORY OF KIDNEY STONES: ICD-10-CM

## 2020-11-18 DIAGNOSIS — Z86.32 HISTORY OF GESTATIONAL DIABETES: ICD-10-CM

## 2020-11-18 DIAGNOSIS — O09.521 ENCOUNTER FOR SUPERVISION OF ELDERLY MULTIGRAVIDA IN FIRST TRIMESTER, ANTEPARTUM: ICD-10-CM

## 2020-11-18 DIAGNOSIS — O09.529 ENCOUNTER FOR SUPERVISION OF HIGH-RISK PREGNANCY WITH ELDERLY MULTIGRAVIDA: Primary | ICD-10-CM

## 2020-11-18 PROBLEM — Z34.90 PREGNANCY: Status: ACTIVE | Noted: 2020-11-18

## 2020-11-18 LAB
ABO + RH BLD: NORMAL
BLOOD BANK CMNT PATIENT-IMP: NORMAL
BLOOD GROUP ANTIBODIES SERPL: NORMAL
ERYTHROCYTE [DISTWIDTH] IN BLOOD BY AUTOMATED COUNT: 13.4 % (ref 11.5–14.5)
HBV SURFACE AG SER QL: <0.1 INDEX
HBV SURFACE AG SER QL: NEGATIVE
HCT VFR BLD AUTO: 40.3 % (ref 35–47)
HGB BLD-MCNC: 13.3 G/DL (ref 11.5–16)
HIV 1+2 AB+HIV1 P24 AG SERPL QL IA: NONREACTIVE
HIV12 RESULT COMMENT, HHIVC: NORMAL
MCH RBC QN AUTO: 29.8 PG (ref 26–34)
MCHC RBC AUTO-ENTMCNC: 33 G/DL (ref 30–36.5)
MCV RBC AUTO: 90.2 FL (ref 80–99)
NRBC # BLD: 0 K/UL (ref 0–0.01)
NRBC BLD-RTO: 0 PER 100 WBC
PLATELET # BLD AUTO: 246 K/UL (ref 150–400)
PMV BLD AUTO: 10.8 FL (ref 8.9–12.9)
RBC # BLD AUTO: 4.47 M/UL (ref 3.8–5.2)
RUBV IGG SER-IMP: REACTIVE
RUBV IGG SERPL IA-ACNC: 309.6 IU/ML
SPECIMEN EXP DATE BLD: NORMAL
WBC # BLD AUTO: 8.5 K/UL (ref 3.6–11)

## 2020-11-18 PROCEDURE — 0500F INITIAL PRENATAL CARE VISIT: CPT | Performed by: OBSTETRICS & GYNECOLOGY

## 2020-11-18 PROCEDURE — 76801 OB US < 14 WKS SINGLE FETUS: CPT | Performed by: OBSTETRICS & GYNECOLOGY

## 2020-11-18 RX ORDER — IRON,CARBONYL/ASCORBIC ACID 65MG-125MG
TABLET, DELAYED RELEASE (ENTERIC COATED) ORAL
COMMUNITY
Start: 2020-01-20

## 2020-11-18 NOTE — PROGRESS NOTES
Current pregnancy history:    Sandi Chatman is a 47 y.o. female who presents for the evaluation of pregnancy. Patient's last menstrual period was 2020 (exact date). LMP history:  The date of her LMP is certain. Her menstrual cycles are regular and occur approximately every 28 days and range from 3 to 5 days. The last menses did last the usual number of days. A urine pregnancy test was positive 4 weeks ago. She was not on the pill at conception. Based on her LMP her EGA is 8 weeks and 3 days giving an [address] of 21. Ultrasound data:  She had an ultrasound done by the ultrasound tech today which revealed a viable tovar pregnancy with a gestational age of 11 weeks and 5 days giving an [address] of 21. Ultrasound details:    TA ULTRASOUND PERFORMED. A SINGLE VIABLE 8W5D IUP IS SEEN WITH NORMAL CARDIAC RHYTHM. GESTATIONAL AGE BASED ON TODAYS US.  A NORMAL APPEARING YOL[address] IS SEEN. RIGHT & LEFT OVARIES APPEAR WITHIN NORMAL LIMITS. NO FREE FLUID SEEN IN THE CDS. Pregnancy symptoms:    Since her LMP she has experienced  urinary frequency, breast tenderness, fatigue and nausea. She has not been vomiting over the last few weeks. Associated signs and symptoms which she denies: dysuria, discharge, vaginal bleeding. She states she has no real change in weight:      Relevant past pregnancy history:  She has the following pregnancy history: PPROM and delivery at 34wks,  GDM  She has a history of  delivery. Relevant past medical history:(relevant to this pregnancy): noncontributory. Pap/Occupational history:  Last pap smear:   Results: normal     Her occupation is: [employer]     Substance history:  Negative for alcohol, tobacco and street drugs. Positive for nothing. Exposure history: There is/are no indoor cat/s in the home. The patient was instructed to not change the cat litter. She admits close contact with children on a regular basis.    She has had chicken pox or the vaccine in the past.   Patient denies issues with domestic violence. Genetic Screening/Teratology Counseling: (Includes patient, baby's father, or anyone in either family with:)  3.  Patient's age >/= 28 at [facility]?-- YES  . 2. Thalassemia ([facility] [place], [address], or  background): MCV<80?--no.     3.  Neural tube defect (meningomyelocele, spina bifida, anencephaly)?--no.   4.  Congenital heart defect?--no.  5.  Down syndrome?--no.   6.  Akash-Sachs (Sabianism, [place])?--no.   7.  Canavan's Disease?--no.   8.  Familial Dysautonomia?--no.   9.  Sickle cell disease or trait ()? --no   The patient has not been tested for sickle trait  10. Hemophilia or other blood disorders?--no. 11.  Muscular dystrophy?--no. 12.  Cystic fibrosis?--no. 13.  Sarkis's Chorea?--no. 14.  Mental retardation/autism (if yes was person tested for Fragile X)?--no. 15.  Other inherited genetic or chromosomal disorder?--no. 12.  Maternal metabolic disorder (DM, PKU, etc)?--no. 17.  Patient or FOB with a child with a birth defect not listed above?--no.  17a. Patient or FOB with a birth defect themselves?--no. 18.  Recurrent pregnancy loss, or stillbirth?--no. 19.  Any medications since LMP other than prenatal vitamins (include vitamins, supplements, OTC meds, drugs, alcohol)? --YES.  20.  Any other genetic/environmental exposure to discuss?--no. Infection History:  1. Lives with someone with TB or TB exposed?--no.   2.  Patient or partner has history of genital herpes?--no.  3.  Rash or viral illness since LMP?--no.    4.  History of STD (GC, CT, HPV, syphilis, HIV)? --no   5. Other: OTHER?       Past Medical History:   Diagnosis Date    Abnormal Pap smear 2005    Abnormal pap; Normal bx with colpo    Diabetes ([address]     Hair loss     For last 6 months    HX OTHER MEDICAL     migraines without aura    Hypothyroidism due to Hashimoto's thyroiditis 2/21/2017    Kidney disease 2003    Kidney stones; lithrotripsy 2003: Scan clear in 4/2011     Past Surgical History:   Procedure Laterality Date    HX LITHOTRIPSY  2004    HX WISDOM TEETH EXTRACTION  2002     Social History     Occupational History    Occupation:      Employer: [employer]   Tobacco Use    Smoking status: Never Smoker    Smokeless tobacco: Never Used   Substance and Sexual Activity    Alcohol use: No    Drug use: No    Sexual activity: Yes     Partners: Male     Birth control/protection: None     Family History   Problem Relation Age of Onset    Diabetes Father     Hypertension Father     Heart Disease Mother     Diabetes Mother 27        Gestational DM; No recurrent DM as of yet    Diabetes Maternal Grandmother     Cancer Maternal Grandmother 80        Breast    Dementia Maternal Grandmother         Sundowners    Diabetes Paternal Grandmother     Thyroid Disease Neg Hx        Allergies   Allergen Reactions    Penicillins Hives     Not all the Penicillins just Penicillin only     Prior to Admission medications    Medication Sig Start Date End Date Taking? Authorizing Provider   iron,carbonyl-vitamin C (Vitron-C) 65 mg iron- 125 mg TbEC 1 tablet 1/20/20  Yes Provider, Historical   levothyroxine (SYNTHROID) 75 mcg tablet Take 1 Tab by mouth Daily (before breakfast). 2/21/17  Yes López Pat MD   cholecalciferol, vitamin D3, (VITAMIN D3) 2,000 unit tab Take  by mouth. Yes Provider, Historical   prenatal vit-iron fumarate-fa (PRENATAL VITAMIN) 27-0.8 mg Tab tablet Take 1 Tab by mouth daily. Yes Provider, Historical   ferrous sulfate 325 mg (65 mg iron) cpER Take  by mouth. Provider, Historical   Biotin 2,500 mcg cap Take  by mouth.     Provider, Historical        Review of Systems: History obtained from the patient  Constitutional: negative for weight loss, fever, night sweats  HEENT: negative for hearing loss, earache, congestion, snoring, sorethroat  CV: negative for chest pain, palpitations, edema  Resp: negative for cough, shortness of breath, wheezing  Breast: negative for breast lumps, nipple discharge, galactorrhea  GI: negative for change in bowel habits, abdominal pain, black or bloody stools  : negative for frequency, dysuria, hematuria, vaginal discharge  MSK: negative for back pain, joint pain, muscle pain  Skin: negative for itching, rash, hives  Neuro: negative for dizziness, headache, confusion, weakness  Psych: negative for anxiety, depression, change in mood  Heme/lymph: negative for bleeding, bruising, pallor    Objective:  Visit Vitals  /64   Ht 5' 3\" (1.6 m)   Wt 117 lb (53.1 kg)   LMP 09/20/2020 (Exact Date)   BMI 20.73 kg/m²       Physical Exam:   PHYSICAL EXAMINATION    Constitutional  · Appearance: well-nourished, well developed, alert, in no acute distress    HENT  · Head  · Face: appears normal  · Eyes: appear normal  · Ears: normal  · Mouth: normal  · Lips: no lesions    Neck  · Inspection/Palpation: normal appearance, no masses or tenderness  · Lymph Nodes: no lymphadenopathy present  · Thyroid: gland size normal, nontender, no nodules or masses present on palpation    Chest  · Respiratory Effort: breathing unlabored  · Auscultation: normal breath sounds    Cardiovascular  · Heart:  · Auscultation: regular rate and rhythm without murmur    Breasts  · Inspection of Breasts: breasts symmetrical, no skin changes, no discharge present, nipple appearance normal, no skin retraction present  · Palpation of Breasts and Axillae: no masses present on palpation, no breast tenderness  · Axillary Lymph Nodes: no lymphadenopathy present    Gastrointestinal  · Abdominal Examination: abdomen non-tender to palpation, normal bowel sounds, no masses present  · Liver and spleen: no hepatomegaly present, spleen not palpable  · Hernias: no hernias identified    Genitourinary  · External Genitalia: normal appearance for age, no discharge present, no tenderness present, no inflammatory lesions present, no masses present, no atrophy present  · Vagina: normal vaginal vault without central or paravaginal defects, no discharge present, no inflammatory lesions present, no masses present  · Bladder: non-tender to palpation  · Urethra: appears normal  · Cervix: normal   · Uterus: enlarged, normal shape, soft  · Adnexa: no adnexal tenderness present, no adnexal masses present  · Perineum: perineum within normal limits, no evidence of trauma, no rashes or skin lesions present  · Anus: anus within normal limits, no hemorrhoids present  · Inguinal Lymph Nodes: no lymphadenopathy present    Skin  · General Inspection: no rash, no lesions identified    Neurologic/Psychiatric  · Mental Status:  · Orientation: grossly oriented to person, place and time  · Mood and Affect: mood normal, affect appropriate    Assessment:   Intrauterine pregnancy with the following problems identified: AMA, hypothyroid, h/o PROM and PTD @ 34 weeks. Plan:     Offered CF testing, CVS, Nuchal Translucency, MSAFP, amnio, and discussed NIPT  Course of pregnancy discussed including visit schedule, routine U/S, glucola testing, etc.  Avoid alcoholic beverages and illicit/recreational drugs use  Take prenatal vitamins or folic acid daily. Hospital and practice style discussed with coverage system. Discussed nutrition, toxoplasmosis precautions, sexual activity, exercise, need for influenza vaccine, environmental and work hazards, travel advice, screen for domestic violence, need for seat belts. Discussed seafood, unpasteurized dairy products, deli meat, artificial sweeteners, and caffeine. Information on prenatal classes/breastfeeding given. Information on circumcision given  Patient encouraged not to smoke. Discussed current prescription drug use. Given medication list.  Discussed the use of over the counter medications and chemicals.   .  Pt understands risk of hemorrhage during pregnancy and post delivery and would accept blood products if necessary in life-threatening emergencies      Handouts given to pt.

## 2020-11-19 LAB
BACTERIA SPEC CULT: NORMAL
RPR SER QL: NONREACTIVE
SERVICE CMNT-IMP: NORMAL

## 2020-11-24 ENCOUNTER — TELEPHONE (OUTPATIENT)
Dept: OBGYN CLINIC | Age: 40
End: 2020-11-24

## 2020-11-24 NOTE — TELEPHONE ENCOUNTER
Patient is calling stating she was supposed to be scheduled an appointment to see an high risk doctor. Patient currently has a referral to Dr. Javier Amador. Patient stated she was advise by Dr. Eber Vela if she has not had an appointment scheduled by now to contact Dr. Piero Bo nurse for assistance with an appointment. Please advise.

## 2020-11-27 LAB
C TRACH RRNA CVX QL NAA+PROBE: NEGATIVE
CYTOLOGIST CVX/VAG CYTO: ABNORMAL
CYTOLOGY CVX/VAG DOC CYTO: ABNORMAL
CYTOLOGY CVX/VAG DOC THIN PREP: ABNORMAL
CYTOLOGY HISTORY:: ABNORMAL
DX ICD CODE: ABNORMAL
DX ICD CODE: ABNORMAL
HPV I/H RISK 4 DNA CVX QL PROBE+SIG AMP: NEGATIVE
Lab: ABNORMAL
N GONORRHOEA RRNA CVX QL NAA+PROBE: NEGATIVE
OTHER STN SPEC: ABNORMAL
PATHOLOGIST CVX/VAG CYTO: ABNORMAL
STAT OF ADQ CVX/VAG CYTO-IMP: ABNORMAL

## 2020-12-16 ENCOUNTER — ROUTINE PRENATAL (OUTPATIENT)
Dept: OBGYN CLINIC | Age: 40
End: 2020-12-16
Payer: COMMERCIAL

## 2020-12-16 ENCOUNTER — HOSPITAL ENCOUNTER (OUTPATIENT)
Dept: PERINATAL CARE | Age: 40
Discharge: HOME | End: 2020-12-16
Attending: OBSTETRICS & GYNECOLOGY
Payer: COMMERCIAL

## 2020-12-16 VITALS
HEIGHT: 63 IN | WEIGHT: 120 LBS | DIASTOLIC BLOOD PRESSURE: 58 MMHG | SYSTOLIC BLOOD PRESSURE: 110 MMHG | BODY MASS INDEX: 21.26 KG/M2

## 2020-12-16 DIAGNOSIS — O09.529 ENCOUNTER FOR SUPERVISION OF HIGH-RISK PREGNANCY WITH ELDERLY MULTIGRAVIDA: Primary | ICD-10-CM

## 2020-12-16 PROCEDURE — 0502F SUBSEQUENT PRENATAL CARE: CPT | Performed by: OBSTETRICS & GYNECOLOGY

## 2020-12-16 PROCEDURE — 76801 OB US < 14 WKS SINGLE FETUS: CPT | Performed by: OBSTETRICS & GYNECOLOGY

## 2020-12-16 PROCEDURE — 99204 OFFICE O/P NEW MOD 45 MIN: CPT | Performed by: OBSTETRICS & GYNECOLOGY

## 2020-12-16 PROCEDURE — 76813 OB US NUCHAL MEAS 1 GEST: CPT | Performed by: OBSTETRICS & GYNECOLOGY

## 2020-12-16 NOTE — PROGRESS NOTES
She is going to get [place] and [employer] [address] sent today. Seen by MFM today and has f/u scheduled in 4 weeks.  Wants AFP in 4 weeks

## 2021-01-13 ENCOUNTER — ROUTINE PRENATAL (OUTPATIENT)
Dept: OBGYN CLINIC | Age: 41
End: 2021-01-13
Payer: COMMERCIAL

## 2021-01-13 ENCOUNTER — HOSPITAL ENCOUNTER (OUTPATIENT)
Dept: PERINATAL CARE | Age: 41
Discharge: HOME | End: 2021-01-13
Attending: OBSTETRICS & GYNECOLOGY
Payer: COMMERCIAL

## 2021-01-13 VITALS
BODY MASS INDEX: 22.32 KG/M2 | HEIGHT: 63 IN | DIASTOLIC BLOOD PRESSURE: 64 MMHG | SYSTOLIC BLOOD PRESSURE: 120 MMHG | WEIGHT: 126 LBS

## 2021-01-13 DIAGNOSIS — O09.529 ENCOUNTER FOR SUPERVISION OF HIGH-RISK PREGNANCY WITH ELDERLY MULTIGRAVIDA: Primary | ICD-10-CM

## 2021-01-13 PROCEDURE — 76815 OB US LIMITED FETUS(S): CPT | Performed by: OBSTETRICS & GYNECOLOGY

## 2021-01-13 PROCEDURE — 0502F SUBSEQUENT PRENATAL CARE: CPT | Performed by: OBSTETRICS & GYNECOLOGY

## 2021-01-13 RX ORDER — BLOOD SUGAR DIAGNOSTIC
STRIP MISCELLANEOUS
COMMUNITY
Start: 2020-12-28 | End: 2021-06-23

## 2021-01-13 RX ORDER — LANCETS 33 GAUGE
EACH MISCELLANEOUS
COMMUNITY
Start: 2020-12-28 | End: 2021-06-23

## 2021-01-15 LAB
AFP ADJ MOM SERPL: 1.58
AFP INTERP SERPL-IMP: NORMAL
AFP INTERP SERPL-IMP: NORMAL
AFP SERPL-MCNC: 58.9 NG/ML
AGE AT DELIVERY: 38.9 YR
COMMENT, 018013: NORMAL
GA METHOD: NORMAL
GA: 16 WEEKS
IDDM PATIENT QL: NO
MULTIPLE PREGNANCY: NO
NEURAL TUBE DEFECT RISK FETUS: 2212 %
RESULTS, 017004: NORMAL

## 2021-01-28 ENCOUNTER — TELEPHONE (OUTPATIENT)
Dept: OBGYN CLINIC | Age: 41
End: 2021-01-28

## 2021-01-28 NOTE — TELEPHONE ENCOUNTER
Patient of [address]    She said that about 30 minutes ago she was walking around a store and felt a gush of fluid. Her panties and pants were wet. She said this happened to her another day. She does not know if it was urine or amniotic fluid. She has been advised to dry up, clean up and apply fresh pad and call us if any signs of wetness are there in a half hour to an hour. The only way to check to see if amniotic fluid vs. Urine is to get tested. Doctor on call needs to hear from her if wet again, tonight.

## 2021-02-10 ENCOUNTER — ROUTINE PRENATAL (OUTPATIENT)
Dept: OBGYN CLINIC | Age: 41
End: 2021-02-10
Payer: COMMERCIAL

## 2021-02-10 ENCOUNTER — HOSPITAL ENCOUNTER (OUTPATIENT)
Dept: PERINATAL CARE | Age: 41
Discharge: HOME | End: 2021-02-10
Attending: OBSTETRICS & GYNECOLOGY
Payer: COMMERCIAL

## 2021-02-10 VITALS
SYSTOLIC BLOOD PRESSURE: 98 MMHG | DIASTOLIC BLOOD PRESSURE: 52 MMHG | WEIGHT: 129 LBS | BODY MASS INDEX: 22.86 KG/M2 | HEIGHT: 63 IN

## 2021-02-10 DIAGNOSIS — O09.529 ENCOUNTER FOR SUPERVISION OF HIGH-RISK PREGNANCY WITH ELDERLY MULTIGRAVIDA: Primary | ICD-10-CM

## 2021-02-10 PROCEDURE — 76811 OB US DETAILED SNGL FETUS: CPT | Performed by: OBSTETRICS & GYNECOLOGY

## 2021-02-10 PROCEDURE — 0502F SUBSEQUENT PRENATAL CARE: CPT | Performed by: OBSTETRICS & GYNECOLOGY

## 2021-02-10 NOTE — PROGRESS NOTES
Anatomy scan today at [facility]. They will see her again in 6 weeks.

## 2021-03-10 ENCOUNTER — ROUTINE PRENATAL (OUTPATIENT)
Dept: OBGYN CLINIC | Age: 41
End: 2021-03-10
Payer: COMMERCIAL

## 2021-03-10 VITALS
SYSTOLIC BLOOD PRESSURE: 110 MMHG | WEIGHT: 134 LBS | HEIGHT: 63 IN | BODY MASS INDEX: 23.74 KG/M2 | DIASTOLIC BLOOD PRESSURE: 60 MMHG

## 2021-03-10 DIAGNOSIS — O09.529 ENCOUNTER FOR SUPERVISION OF HIGH-RISK PREGNANCY WITH ELDERLY MULTIGRAVIDA: Primary | ICD-10-CM

## 2021-03-10 PROCEDURE — 0502F SUBSEQUENT PRENATAL CARE: CPT | Performed by: OBSTETRICS & GYNECOLOGY

## 2021-03-24 ENCOUNTER — HOSPITAL ENCOUNTER (OUTPATIENT)
Dept: PERINATAL CARE | Age: 41
Discharge: HOME | End: 2021-03-24
Attending: OBSTETRICS & GYNECOLOGY
Payer: COMMERCIAL

## 2021-03-24 ENCOUNTER — ROUTINE PRENATAL (OUTPATIENT)
Dept: OBGYN CLINIC | Age: 41
End: 2021-03-24
Payer: COMMERCIAL

## 2021-03-24 VITALS
HEIGHT: 63 IN | BODY MASS INDEX: 23.57 KG/M2 | WEIGHT: 133 LBS | DIASTOLIC BLOOD PRESSURE: 54 MMHG | SYSTOLIC BLOOD PRESSURE: 96 MMHG

## 2021-03-24 DIAGNOSIS — O09.529 ENCOUNTER FOR SUPERVISION OF HIGH-RISK PREGNANCY WITH ELDERLY MULTIGRAVIDA: ICD-10-CM

## 2021-03-24 DIAGNOSIS — Z87.51 HISTORY OF PREMATURE DELIVERY: ICD-10-CM

## 2021-03-24 DIAGNOSIS — O47.02 PRETERM UTERINE CONTRACTIONS IN SECOND TRIMESTER, ANTEPARTUM: Primary | ICD-10-CM

## 2021-03-24 LAB — FIBRONECTIN FETAL VAG QL: NEGATIVE

## 2021-03-24 PROCEDURE — 0502F SUBSEQUENT PRENATAL CARE: CPT | Performed by: OBSTETRICS & GYNECOLOGY

## 2021-03-24 PROCEDURE — 76816 OB US FOLLOW-UP PER FETUS: CPT | Performed by: OBSTETRICS & GYNECOLOGY

## 2021-03-24 NOTE — PROGRESS NOTES
Seen by MFM and they recc checking her cervix due to increased contractions per pt; Cervix L/C and FFN sent; given PTL precautions; she will f/u here in 2 weeks for CBC and repeat thyroid testing per her endocrinologist; endocrine discussing possibility of started Metformin. To see MFM in 4 weeks for growth.

## 2021-03-26 RX ORDER — METFORMIN HYDROCHLORIDE 500 MG/1
500 TABLET ORAL 2 TIMES DAILY WITH MEALS
COMMUNITY
End: 2021-06-23

## 2021-04-01 ENCOUNTER — TELEPHONE (OUTPATIENT)
Dept: OBGYN CLINIC | Age: 41
End: 2021-04-01

## 2021-04-01 NOTE — TELEPHONE ENCOUNTER
Patient advised of work in [place]. Dr Debora Gaona recommendations and verbalized understanding    Patient will call back prn

## 2021-04-01 NOTE — TELEPHONE ENCOUNTER
Agree with recommendations, should follow-up with urine culture results.   Rest, hydration, if contractions become consistent and more painful then needs to come in for an exam.

## 2021-04-01 NOTE — TELEPHONE ENCOUNTER
Call received at 10:10am     patient    47year old patient last seen in the office on 3/24/2021      27w4d pregnant .     Patient denies vaginal bleeding,ROM and reports positive fetal movement    Patient calling to say that she is having increasing lashae michelle contractions    Patient reports they can be every 10 -15-20-30 minutes and she can have them every 10 minutes for an hour and then they go to every 15 or 20 and back to every 10 minutes    Patient reports they are not painful, like a squeeze    Patient reports history a kidney stones in pregnancy and passed one at 3:00am and then hour later she had 7 lashae michelle contractions in the hour    Patient went to pcp and left urine to check for uti today    Patient was advised to increase po fluids    Patient has history of  delivery    Please advise

## 2021-04-07 ENCOUNTER — ROUTINE PRENATAL (OUTPATIENT)
Dept: OBGYN CLINIC | Age: 41
End: 2021-04-07
Payer: COMMERCIAL

## 2021-04-07 VITALS — BODY MASS INDEX: 23.91 KG/M2 | DIASTOLIC BLOOD PRESSURE: 58 MMHG | WEIGHT: 135 LBS | SYSTOLIC BLOOD PRESSURE: 96 MMHG

## 2021-04-07 DIAGNOSIS — E03.9 ACQUIRED HYPOTHYROIDISM: ICD-10-CM

## 2021-04-07 DIAGNOSIS — O09.529 ENCOUNTER FOR SUPERVISION OF HIGH-RISK PREGNANCY WITH ELDERLY MULTIGRAVIDA: Primary | ICD-10-CM

## 2021-04-07 DIAGNOSIS — E03.9 HYPOTHYROIDISM, UNSPECIFIED TYPE: ICD-10-CM

## 2021-04-07 PROCEDURE — 0502F SUBSEQUENT PRENATAL CARE: CPT | Performed by: OBSTETRICS & GYNECOLOGY

## 2021-04-07 NOTE — PROGRESS NOTES
CBC today; TFTs per endocrine; still with irregular contractions; PTL precautions; to see MFM in 2 weeks

## 2021-04-08 LAB
ERYTHROCYTE [DISTWIDTH] IN BLOOD BY AUTOMATED COUNT: 13.6 % (ref 11.7–15.4)
FT4I SERPL CALC-MCNC: 1.6 (ref 1.2–4.9)
HCT VFR BLD AUTO: 33.9 % (ref 34–46.6)
HGB BLD-MCNC: 11.6 G/DL (ref 11.1–15.9)
MCH RBC QN AUTO: 30.1 PG (ref 26.6–33)
MCHC RBC AUTO-ENTMCNC: 34.2 G/DL (ref 31.5–35.7)
MCV RBC AUTO: 88 FL (ref 79–97)
PLATELET # BLD AUTO: 190 X10E3/UL (ref 150–450)
RBC # BLD AUTO: 3.86 X10E6/UL (ref 3.77–5.28)
T3RU NFR SERPL: 14 % (ref 24–39)
T4 FREE SERPL-MCNC: 1.01 NG/DL (ref 0.82–1.77)
T4 SERPL-MCNC: 11.6 UG/DL (ref 4.5–12)
TSH SERPL DL<=0.005 MIU/L-ACNC: 1.45 UIU/ML (ref 0.45–4.5)
WBC # BLD AUTO: 7.7 X10E3/UL (ref 3.4–10.8)

## 2021-04-21 ENCOUNTER — ROUTINE PRENATAL (OUTPATIENT)
Dept: OBGYN CLINIC | Age: 41
End: 2021-04-21
Payer: COMMERCIAL

## 2021-04-21 ENCOUNTER — HOSPITAL ENCOUNTER (OUTPATIENT)
Dept: PERINATAL CARE | Age: 41
Discharge: HOME | End: 2021-04-21
Attending: OBSTETRICS & GYNECOLOGY
Payer: COMMERCIAL

## 2021-04-21 VITALS
SYSTOLIC BLOOD PRESSURE: 104 MMHG | HEIGHT: 63 IN | DIASTOLIC BLOOD PRESSURE: 58 MMHG | BODY MASS INDEX: 23.92 KG/M2 | WEIGHT: 135 LBS

## 2021-04-21 DIAGNOSIS — O09.529 ENCOUNTER FOR SUPERVISION OF HIGH-RISK PREGNANCY WITH ELDERLY MULTIGRAVIDA: Primary | ICD-10-CM

## 2021-04-21 DIAGNOSIS — Z23 ENCOUNTER FOR IMMUNIZATION: ICD-10-CM

## 2021-04-21 PROCEDURE — 90715 TDAP VACCINE 7 YRS/> IM: CPT | Performed by: OBSTETRICS & GYNECOLOGY

## 2021-04-21 PROCEDURE — 76816 OB US FOLLOW-UP PER FETUS: CPT | Performed by: OBSTETRICS & GYNECOLOGY

## 2021-04-21 PROCEDURE — 0502F SUBSEQUENT PRENATAL CARE: CPT | Performed by: OBSTETRICS & GYNECOLOGY

## 2021-04-21 PROCEDURE — 90471 IMMUNIZATION ADMIN: CPT | Performed by: OBSTETRICS & GYNECOLOGY

## 2021-04-21 NOTE — PROGRESS NOTES
After obtaining consent, and per orders of Dr Erin Miller, injection of Tdap given in left deltoid by Radha Sanders LPN. Patient instructed to remain in clinic for 20 minutes afterwards, and to report any adverse reaction to me immediately. Lot: [id number] Exp: 01/13/23 NDC: 02439-386-24 , VIS given.

## 2021-05-05 ENCOUNTER — ROUTINE PRENATAL (OUTPATIENT)
Dept: OBGYN CLINIC | Age: 41
End: 2021-05-05
Payer: COMMERCIAL

## 2021-05-05 VITALS
WEIGHT: 137 LBS | DIASTOLIC BLOOD PRESSURE: 64 MMHG | BODY MASS INDEX: 24.27 KG/M2 | HEIGHT: 63 IN | SYSTOLIC BLOOD PRESSURE: 112 MMHG

## 2021-05-05 DIAGNOSIS — O09.529 ENCOUNTER FOR SUPERVISION OF HIGH-RISK PREGNANCY WITH ELDERLY MULTIGRAVIDA: Primary | ICD-10-CM

## 2021-05-05 PROCEDURE — 0502F SUBSEQUENT PRENATAL CARE: CPT | Performed by: OBSTETRICS & GYNECOLOGY

## 2021-05-19 ENCOUNTER — HOSPITAL ENCOUNTER (OUTPATIENT)
Dept: PERINATAL CARE | Age: 41
Discharge: HOME | End: 2021-05-19
Attending: OBSTETRICS & GYNECOLOGY
Payer: COMMERCIAL

## 2021-05-19 ENCOUNTER — ROUTINE PRENATAL (OUTPATIENT)
Dept: OBGYN CLINIC | Age: 41
End: 2021-05-19
Payer: COMMERCIAL

## 2021-05-19 VITALS
BODY MASS INDEX: 24.45 KG/M2 | DIASTOLIC BLOOD PRESSURE: 62 MMHG | WEIGHT: 138 LBS | SYSTOLIC BLOOD PRESSURE: 104 MMHG | HEIGHT: 63 IN

## 2021-05-19 DIAGNOSIS — O09.529 ENCOUNTER FOR SUPERVISION OF HIGH-RISK PREGNANCY WITH ELDERLY MULTIGRAVIDA: Primary | ICD-10-CM

## 2021-05-19 PROCEDURE — 76816 OB US FOLLOW-UP PER FETUS: CPT | Performed by: OBSTETRICS & GYNECOLOGY

## 2021-05-19 PROCEDURE — 76819 FETAL BIOPHYS PROFIL W/O NST: CPT | Performed by: OBSTETRICS & GYNECOLOGY

## 2021-05-19 PROCEDURE — 0502F SUBSEQUENT PRENATAL CARE: CPT | Performed by: OBSTETRICS & GYNECOLOGY

## 2021-05-26 ENCOUNTER — ROUTINE PRENATAL (OUTPATIENT)
Dept: OBGYN CLINIC | Age: 41
End: 2021-05-26
Payer: COMMERCIAL

## 2021-05-26 ENCOUNTER — HOSPITAL ENCOUNTER (OUTPATIENT)
Dept: PERINATAL CARE | Age: 41
Discharge: HOME | End: 2021-05-26
Attending: OBSTETRICS & GYNECOLOGY
Payer: COMMERCIAL

## 2021-05-26 VITALS
HEIGHT: 63 IN | BODY MASS INDEX: 24.63 KG/M2 | WEIGHT: 139 LBS | DIASTOLIC BLOOD PRESSURE: 66 MMHG | SYSTOLIC BLOOD PRESSURE: 108 MMHG

## 2021-05-26 DIAGNOSIS — O09.529 ENCOUNTER FOR SUPERVISION OF HIGH-RISK PREGNANCY WITH ELDERLY MULTIGRAVIDA: Primary | ICD-10-CM

## 2021-05-26 PROCEDURE — 76819 FETAL BIOPHYS PROFIL W/O NST: CPT | Performed by: OBSTETRICS & GYNECOLOGY

## 2021-05-26 PROCEDURE — 0502F SUBSEQUENT PRENATAL CARE: CPT | Performed by: OBSTETRICS & GYNECOLOGY

## 2021-06-02 ENCOUNTER — TELEPHONE (OUTPATIENT)
Dept: FAMILY MEDICINE CLINIC | Age: 41
End: 2021-06-02

## 2021-06-02 ENCOUNTER — HOSPITAL ENCOUNTER (OUTPATIENT)
Dept: PERINATAL CARE | Age: 41
Discharge: HOME | End: 2021-06-02
Attending: OBSTETRICS & GYNECOLOGY
Payer: COMMERCIAL

## 2021-06-02 ENCOUNTER — ROUTINE PRENATAL (OUTPATIENT)
Dept: OBGYN CLINIC | Age: 41
End: 2021-06-02
Payer: COMMERCIAL

## 2021-06-02 VITALS
HEIGHT: 63 IN | DIASTOLIC BLOOD PRESSURE: 60 MMHG | SYSTOLIC BLOOD PRESSURE: 108 MMHG | BODY MASS INDEX: 24.8 KG/M2 | WEIGHT: 140 LBS

## 2021-06-02 DIAGNOSIS — Z36.85 ANTENATAL SCREENING FOR STREPTOCOCCUS B: ICD-10-CM

## 2021-06-02 DIAGNOSIS — O09.529 ENCOUNTER FOR SUPERVISION OF HIGH-RISK PREGNANCY WITH ELDERLY MULTIGRAVIDA: Primary | ICD-10-CM

## 2021-06-02 PROCEDURE — 0502F SUBSEQUENT PRENATAL CARE: CPT | Performed by: OBSTETRICS & GYNECOLOGY

## 2021-06-02 PROCEDURE — 76819 FETAL BIOPHYS PROFIL W/O NST: CPT | Performed by: OBSTETRICS & GYNECOLOGY

## 2021-06-02 NOTE — PROGRESS NOTES
Returned after hours page. Patient with spotting after cervical check earlier today. States blood with a little mucous covering half a panty liner. Explained sounds like appropriate amount of bloody show, call back or go to L&D for heavier bleeding.

## 2021-06-04 LAB
GP B STREP DNA SPEC QL NAA+PROBE: NEGATIVE
SPECIMEN SOURCE: NORMAL

## 2021-06-09 ENCOUNTER — HOSPITAL ENCOUNTER (OUTPATIENT)
Dept: PERINATAL CARE | Age: 41
Discharge: HOME | End: 2021-06-09
Attending: OBSTETRICS & GYNECOLOGY
Payer: COMMERCIAL

## 2021-06-09 ENCOUNTER — ROUTINE PRENATAL (OUTPATIENT)
Dept: OBGYN CLINIC | Age: 41
End: 2021-06-09
Payer: COMMERCIAL

## 2021-06-09 VITALS
HEIGHT: 63 IN | DIASTOLIC BLOOD PRESSURE: 58 MMHG | SYSTOLIC BLOOD PRESSURE: 106 MMHG | BODY MASS INDEX: 24.63 KG/M2 | WEIGHT: 139 LBS

## 2021-06-09 DIAGNOSIS — O09.529 ENCOUNTER FOR SUPERVISION OF HIGH-RISK PREGNANCY WITH ELDERLY MULTIGRAVIDA: Primary | ICD-10-CM

## 2021-06-09 PROCEDURE — 0502F SUBSEQUENT PRENATAL CARE: CPT | Performed by: OBSTETRICS & GYNECOLOGY

## 2021-06-09 PROCEDURE — 76819 FETAL BIOPHYS PROFIL W/O NST: CPT | Performed by: OBSTETRICS & GYNECOLOGY

## 2021-06-16 ENCOUNTER — HOSPITAL ENCOUNTER (OUTPATIENT)
Dept: PERINATAL CARE | Age: 41
Discharge: HOME | End: 2021-06-16
Attending: OBSTETRICS & GYNECOLOGY
Payer: COMMERCIAL

## 2021-06-16 ENCOUNTER — ROUTINE PRENATAL (OUTPATIENT)
Dept: OBGYN CLINIC | Age: 41
End: 2021-06-16
Payer: COMMERCIAL

## 2021-06-16 ENCOUNTER — HOSPITAL ENCOUNTER (OUTPATIENT)
Dept: LAB | Age: 41
Discharge: HOME | End: 2021-06-16
Payer: COMMERCIAL

## 2021-06-16 ENCOUNTER — TRANSCRIBE ORDER (OUTPATIENT)
Dept: EMERGENCY DEPT | Age: 41
End: 2021-06-16

## 2021-06-16 VITALS
BODY MASS INDEX: 24.8 KG/M2 | DIASTOLIC BLOOD PRESSURE: 76 MMHG | HEART RATE: 65 BPM | WEIGHT: 140 LBS | SYSTOLIC BLOOD PRESSURE: 119 MMHG

## 2021-06-16 DIAGNOSIS — O09.529 ENCOUNTER FOR SUPERVISION OF HIGH-RISK PREGNANCY WITH ELDERLY MULTIGRAVIDA: Primary | ICD-10-CM

## 2021-06-16 DIAGNOSIS — O24.415 GESTATIONAL DIABETES MELLITUS (GDM) IN THIRD TRIMESTER CONTROLLED ON ORAL HYPOGLYCEMIC DRUG: ICD-10-CM

## 2021-06-16 DIAGNOSIS — Z01.812 BLOOD TESTS PRIOR TO TREATMENT OR PROCEDURE: Primary | ICD-10-CM

## 2021-06-16 DIAGNOSIS — Z01.812 PRE-PROCEDURAL LABORATORY EXAMINATIONS: ICD-10-CM

## 2021-06-16 PROCEDURE — 0502F SUBSEQUENT PRENATAL CARE: CPT | Performed by: OBSTETRICS & GYNECOLOGY

## 2021-06-16 PROCEDURE — 76819 FETAL BIOPHYS PROFIL W/O NST: CPT | Performed by: OBSTETRICS & GYNECOLOGY

## 2021-06-16 PROCEDURE — U0003 INFECTIOUS AGENT DETECTION BY NUCLEIC ACID (DNA OR RNA); SEVERE ACUTE RESPIRATORY SYNDROME CORONAVIRUS 2 (SARS-COV-2) (CORONAVIRUS DISEASE [COVID-19]), AMPLIFIED PROBE TECHNIQUE, MAKING USE OF HIGH THROUGHPUT TECHNOLOGIES AS DESCRIBED BY CMS-2020-01-R: HCPCS

## 2021-06-16 PROCEDURE — 76816 OB US FOLLOW-UP PER FETUS: CPT | Performed by: OBSTETRICS & GYNECOLOGY

## 2021-06-16 NOTE — PROGRESS NOTES
pt. A2DM, metformin 500mg BID. Good control (FBS=70s-80s, 2hr PP<120). IOL scheduled 6/22, RAISSA with [facility] 6/21.

## 2021-06-17 LAB
SARS-COV-2, XPLCVT: NOT DETECTED
SOURCE, COVRS: NORMAL

## 2021-06-21 ENCOUNTER — TELEPHONE (OUTPATIENT)
Dept: OBGYN CLINIC | Age: 41
End: 2021-06-21

## 2021-06-21 ENCOUNTER — ROUTINE PRENATAL (OUTPATIENT)
Dept: OBGYN CLINIC | Age: 41
End: 2021-06-21

## 2021-06-21 ENCOUNTER — HOSPITAL ENCOUNTER (INPATIENT)
Age: 41
LOS: 2 days | Discharge: HOME | End: 2021-06-23
Attending: OBSTETRICS & GYNECOLOGY | Admitting: OBSTETRICS & GYNECOLOGY
Payer: COMMERCIAL

## 2021-06-21 ENCOUNTER — ROUTINE PRENATAL (OUTPATIENT)
Dept: OBGYN CLINIC | Age: 41
End: 2021-06-21
Payer: COMMERCIAL

## 2021-06-21 VITALS
HEIGHT: 63 IN | BODY MASS INDEX: 25.16 KG/M2 | WEIGHT: 142 LBS | SYSTOLIC BLOOD PRESSURE: 102 MMHG | DIASTOLIC BLOOD PRESSURE: 58 MMHG

## 2021-06-21 DIAGNOSIS — O09.529 ENCOUNTER FOR SUPERVISION OF HIGH-RISK PREGNANCY WITH ELDERLY MULTIGRAVIDA: Primary | ICD-10-CM

## 2021-06-21 DIAGNOSIS — Z34.90 PREGNANCY, UNSPECIFIED GESTATIONAL AGE: ICD-10-CM

## 2021-06-21 DIAGNOSIS — R52 POSTPARTUM PAIN: Primary | ICD-10-CM

## 2021-06-21 LAB
ALBUMIN SERPL-MCNC: 2.8 G/DL (ref 3.5–5)
ALBUMIN/GLOB SERPL: 0.8 {RATIO} (ref 1.1–2.2)
ALP SERPL-CCNC: 98 U/L (ref 45–117)
ALT SERPL-CCNC: 70 U/L (ref 12–78)
ANION GAP SERPL CALC-SCNC: 11 MMOL/L (ref 5–15)
AST SERPL-CCNC: 44 U/L (ref 15–37)
BASOPHILS # BLD: 0 K/UL (ref 0–0.1)
BASOPHILS NFR BLD: 0 % (ref 0–1)
BILIRUB SERPL-MCNC: 0.3 MG/DL (ref 0.2–1)
BUN SERPL-MCNC: 14 MG/DL (ref 6–20)
BUN/CREAT SERPL: 25 (ref 12–20)
CALCIUM SERPL-MCNC: 9 MG/DL (ref 8.5–10.1)
CHLORIDE SERPL-SCNC: 103 MMOL/L (ref 97–108)
CO2 SERPL-SCNC: 20 MMOL/L (ref 21–32)
CREAT SERPL-MCNC: 0.56 MG/DL (ref 0.55–1.02)
DIFFERENTIAL METHOD BLD: ABNORMAL
EOSINOPHIL # BLD: 0 K/UL (ref 0–0.4)
EOSINOPHIL NFR BLD: 0 % (ref 0–7)
ERYTHROCYTE [DISTWIDTH] IN BLOOD BY AUTOMATED COUNT: 14.4 % (ref 11.5–14.5)
GLOBULIN SER CALC-MCNC: 3.7 G/DL (ref 2–4)
GLUCOSE SERPL-MCNC: 69 MG/DL (ref 65–100)
HCT VFR BLD AUTO: 38.3 % (ref 35–47)
HGB BLD-MCNC: 12.5 G/DL (ref 11.5–16)
IMM GRANULOCYTES # BLD AUTO: 0.1 K/UL (ref 0–0.04)
IMM GRANULOCYTES NFR BLD AUTO: 1 % (ref 0–0.5)
LYMPHOCYTES # BLD: 1.8 K/UL (ref 0.8–3.5)
LYMPHOCYTES NFR BLD: 16 % (ref 12–49)
MCH RBC QN AUTO: 29.4 PG (ref 26–34)
MCHC RBC AUTO-ENTMCNC: 32.6 G/DL (ref 30–36.5)
MCV RBC AUTO: 90.1 FL (ref 80–99)
MONOCYTES # BLD: 1 K/UL (ref 0–1)
MONOCYTES NFR BLD: 8 % (ref 5–13)
NEUTS SEG # BLD: 8.6 K/UL (ref 1.8–8)
NEUTS SEG NFR BLD: 75 % (ref 32–75)
NRBC # BLD: 0 K/UL (ref 0–0.01)
NRBC BLD-RTO: 0 PER 100 WBC
PLATELET # BLD AUTO: 151 K/UL (ref 150–400)
PMV BLD AUTO: 12.3 FL (ref 8.9–12.9)
POTASSIUM SERPL-SCNC: 3.9 MMOL/L (ref 3.5–5.1)
PROT SERPL-MCNC: 6.5 G/DL (ref 6.4–8.2)
RBC # BLD AUTO: 4.25 M/UL (ref 3.8–5.2)
SODIUM SERPL-SCNC: 134 MMOL/L (ref 136–145)
WBC # BLD AUTO: 11.4 K/UL (ref 3.6–11)

## 2021-06-21 PROCEDURE — 2709999900 HC NON-CHARGEABLE SUPPLY

## 2021-06-21 PROCEDURE — 75410000000 HC DELIVERY VAGINAL/SINGLE: Performed by: OBSTETRICS & GYNECOLOGY

## 2021-06-21 PROCEDURE — 36415 COLL VENOUS BLD VENIPUNCTURE: CPT

## 2021-06-21 PROCEDURE — 65270000029 HC RM PRIVATE

## 2021-06-21 PROCEDURE — 0KQM0ZZ REPAIR PERINEUM MUSCLE, OPEN APPROACH: ICD-10-PCS | Performed by: OBSTETRICS & GYNECOLOGY

## 2021-06-21 PROCEDURE — 74011000250 HC RX REV CODE- 250

## 2021-06-21 PROCEDURE — 75410000002 HC LABOR FEE PER 1 HR: Performed by: OBSTETRICS & GYNECOLOGY

## 2021-06-21 PROCEDURE — 0502F SUBSEQUENT PRENATAL CARE: CPT | Performed by: OBSTETRICS & GYNECOLOGY

## 2021-06-21 PROCEDURE — 80053 COMPREHEN METABOLIC PANEL: CPT

## 2021-06-21 PROCEDURE — 74011250636 HC RX REV CODE- 250/636: Performed by: OBSTETRICS & GYNECOLOGY

## 2021-06-21 PROCEDURE — 74011250637 HC RX REV CODE- 250/637: Performed by: OBSTETRICS & GYNECOLOGY

## 2021-06-21 PROCEDURE — 59400 OBSTETRICAL CARE: CPT | Performed by: OBSTETRICS & GYNECOLOGY

## 2021-06-21 PROCEDURE — 85025 COMPLETE CBC W/AUTO DIFF WBC: CPT

## 2021-06-21 PROCEDURE — 75410000003 HC RECOV DEL/VAG/CSECN EA 0.5 HR: Performed by: OBSTETRICS & GYNECOLOGY

## 2021-06-21 PROCEDURE — 74011250636 HC RX REV CODE- 250/636

## 2021-06-21 RX ORDER — NALOXONE HYDROCHLORIDE 0.4 MG/ML
0.4 INJECTION, SOLUTION INTRAMUSCULAR; INTRAVENOUS; SUBCUTANEOUS AS NEEDED
Status: DISCONTINUED | OUTPATIENT
Start: 2021-06-21 | End: 2021-06-21

## 2021-06-21 RX ORDER — IBUPROFEN 800 MG/1
800 TABLET ORAL
Status: DISCONTINUED | OUTPATIENT
Start: 2021-06-21 | End: 2021-06-23 | Stop reason: HOSPADM

## 2021-06-21 RX ORDER — OXYTOCIN/RINGER'S LACTATE 30/500 ML
10 PLASTIC BAG, INJECTION (ML) INTRAVENOUS AS NEEDED
Status: COMPLETED | OUTPATIENT
Start: 2021-06-21 | End: 2021-06-21

## 2021-06-21 RX ORDER — OXYTOCIN/RINGER'S LACTATE 30/500 ML
PLASTIC BAG, INJECTION (ML) INTRAVENOUS
Status: COMPLETED
Start: 2021-06-21 | End: 2021-06-21

## 2021-06-21 RX ORDER — SODIUM CHLORIDE, SODIUM LACTATE, POTASSIUM CHLORIDE, CALCIUM CHLORIDE 600; 310; 30; 20 MG/100ML; MG/100ML; MG/100ML; MG/100ML
125 INJECTION, SOLUTION INTRAVENOUS CONTINUOUS
Status: DISCONTINUED | OUTPATIENT
Start: 2021-06-21 | End: 2021-06-21

## 2021-06-21 RX ORDER — ZOLPIDEM TARTRATE 5 MG/1
5 TABLET ORAL
Status: DISCONTINUED | OUTPATIENT
Start: 2021-06-21 | End: 2021-06-23 | Stop reason: HOSPADM

## 2021-06-21 RX ORDER — SODIUM CHLORIDE 0.9 % (FLUSH) 0.9 %
5-40 SYRINGE (ML) INJECTION AS NEEDED
Status: DISCONTINUED | OUTPATIENT
Start: 2021-06-21 | End: 2021-06-21

## 2021-06-21 RX ORDER — SIMETHICONE 80 MG
80 TABLET,CHEWABLE ORAL
Status: DISCONTINUED | OUTPATIENT
Start: 2021-06-21 | End: 2021-06-23 | Stop reason: HOSPADM

## 2021-06-21 RX ORDER — HYDROCODONE BITARTRATE AND ACETAMINOPHEN 7.5; 325 MG/1; MG/1
1 TABLET ORAL
Status: DISCONTINUED | OUTPATIENT
Start: 2021-06-21 | End: 2021-06-23 | Stop reason: HOSPADM

## 2021-06-21 RX ORDER — DOCUSATE SODIUM 100 MG/1
100 CAPSULE, LIQUID FILLED ORAL
Status: DISCONTINUED | OUTPATIENT
Start: 2021-06-21 | End: 2021-06-23 | Stop reason: HOSPADM

## 2021-06-21 RX ORDER — HYDROMORPHONE HYDROCHLORIDE 1 MG/ML
1 INJECTION, SOLUTION INTRAMUSCULAR; INTRAVENOUS; SUBCUTANEOUS
Status: DISCONTINUED | OUTPATIENT
Start: 2021-06-21 | End: 2021-06-23

## 2021-06-21 RX ORDER — NALOXONE HYDROCHLORIDE 0.4 MG/ML
0.4 INJECTION, SOLUTION INTRAMUSCULAR; INTRAVENOUS; SUBCUTANEOUS AS NEEDED
Status: DISCONTINUED | OUTPATIENT
Start: 2021-06-21 | End: 2021-06-23

## 2021-06-21 RX ORDER — OXYTOCIN/RINGER'S LACTATE 30/500 ML
87.3 PLASTIC BAG, INJECTION (ML) INTRAVENOUS AS NEEDED
Status: DISCONTINUED | OUTPATIENT
Start: 2021-06-21 | End: 2021-06-23

## 2021-06-21 RX ORDER — OXYTOCIN/RINGER'S LACTATE 30/500 ML
87.3 PLASTIC BAG, INJECTION (ML) INTRAVENOUS AS NEEDED
Status: DISCONTINUED | OUTPATIENT
Start: 2021-06-21 | End: 2021-06-21

## 2021-06-21 RX ORDER — LIDOCAINE HYDROCHLORIDE 10 MG/ML
INJECTION INFILTRATION; PERINEURAL
Status: COMPLETED
Start: 2021-06-21 | End: 2021-06-21

## 2021-06-21 RX ORDER — DIPHENHYDRAMINE HCL 25 MG
25 CAPSULE ORAL
Status: DISCONTINUED | OUTPATIENT
Start: 2021-06-21 | End: 2021-06-23 | Stop reason: HOSPADM

## 2021-06-21 RX ORDER — ONDANSETRON 2 MG/ML
4 INJECTION INTRAMUSCULAR; INTRAVENOUS
Status: DISCONTINUED | OUTPATIENT
Start: 2021-06-21 | End: 2021-06-23

## 2021-06-21 RX ORDER — OXYTOCIN/RINGER'S LACTATE 30/500 ML
10 PLASTIC BAG, INJECTION (ML) INTRAVENOUS AS NEEDED
Status: DISCONTINUED | OUTPATIENT
Start: 2021-06-21 | End: 2021-06-23

## 2021-06-21 RX ORDER — SODIUM CHLORIDE 0.9 % (FLUSH) 0.9 %
5-40 SYRINGE (ML) INJECTION EVERY 8 HOURS
Status: DISCONTINUED | OUTPATIENT
Start: 2021-06-21 | End: 2021-06-21

## 2021-06-21 RX ADMIN — LIDOCAINE HYDROCHLORIDE: 10 INJECTION, SOLUTION INFILTRATION; PERINEURAL at 13:43

## 2021-06-21 RX ADMIN — IBUPROFEN 800 MG: 800 TABLET, FILM COATED ORAL at 22:29

## 2021-06-21 RX ADMIN — IBUPROFEN 800 MG: 800 TABLET, FILM COATED ORAL at 14:21

## 2021-06-21 RX ADMIN — SODIUM CHLORIDE, POTASSIUM CHLORIDE, SODIUM LACTATE AND CALCIUM CHLORIDE 500 ML: 600; 310; 30; 20 INJECTION, SOLUTION INTRAVENOUS at 12:40

## 2021-06-21 RX ADMIN — HYDROCODONE BITARTRATE AND ACETAMINOPHEN 1 TABLET: 7.5; 325 TABLET ORAL at 14:20

## 2021-06-21 RX ADMIN — Medication 10000 MILLI-UNITS: at 13:42

## 2021-06-21 RX ADMIN — OXYTOCIN 10000 MILLI-UNITS: 10 INJECTION, SOLUTION INTRAMUSCULAR; INTRAVENOUS at 13:42

## 2021-06-21 NOTE — PROGRESS NOTES
1330: Pt stating \"I have to push\" SVE by Addie Lea RN, complete. Dr. Love Guerra called to bedside. 1335: RN remained at bedside throughout pushing. EFM continuously assessed. Vaginal delivery of viable infant. 1620: TRANSFER - OUT REPORT:    Verbal report given to [facility] RN(name) on Neel Tipton  being transferred to [facility](unit) for routine progression of care       Report consisted of patients Situation, Background, Assessment and   Recommendations(SBAR). Information from the following report(s) SBAR, Kardex, Intake/Output, MAR, Recent Results, Med Rec Status, Alarm Parameters  and Quality Measures was reviewed with the receiving nurse. Lines:   Peripheral IV 06/21/21 Left Hand (Active)        Opportunity for questions and clarification was provided.       Patient transported with:   Registered Nurse

## 2021-06-21 NOTE — TELEPHONE ENCOUNTER
Call received at 635am    47year old patient last seen in the office early this am    Patient  39w1d pregnant    Patient calling to say that she is having contractions every 6 minutes for the past hour. Patient was advised to come to the office now and was placed on the schedule per Dr. Martha Reyes. 11:30am      Patient verbalized understanding.       TT aware of the add on appointment

## 2021-06-21 NOTE — PROGRESS NOTES
1900- Bedside shift change report given to SOURAV Akbar RN (oncoming nurse) by Michael Carrasco RNC (offgoing nurse). Report included the following information SBAR, Intake/Output, MAR and Recent Results.

## 2021-06-21 NOTE — PROGRESS NOTES
6/21/2021  1:28 PM    CM met with MICKEY to complete initial assessment and begin discharge planning. MOB verified and confirmed demographics. MOB lives with spouse/FOB-Bart Muhammad, along with their 5, and 7yr old, at the address on file. MOB does not work and plans to be home with infant. . FOB is employed and will be taking adequate time off. MICKEY reports she has good family support. MICKEY plans to breast feed baby and has pump to use at home. MOB plans to follow with [facility] pediatrics, for pediatric care. MICKEY has car seat, bassinet/crib, clothing, bottles and all necessary supplies for baby. MICKEY has [email or web address], and will be adding baby to this policy. CM discussed process to add baby to insurance, MOB verbalized understanding. MOB denied needing WIC/Medicaid services. Care Management Interventions  PCP Verified by CM: Yes [facility])  Mode of Transport at Discharge:  Other (see comment)  Transition of Care Consult (CM Consult): Discharge Planning  Current Support Network: Own Home, Family Lives Nearby, Lives with Spouse  Confirm Follow Up Transport: Family  Discharge Location  Discharge Placement: Home with family assistance  Johnathan Chapman

## 2021-06-21 NOTE — PROCEDURES
Delivery Note    Patient delivered by .  n/a pounds n/a ounces. Apgars 9 @ one minute and 9 @ five minutes. no episiotomy, second degree tear, repaired with 3-0 vicryl. Third stage normal. Placenta delivered spontaneously. EBL 200cc. Uterus and vagina clear of sponges. Counts  Correct.  TBLMI

## 2021-06-21 NOTE — LACTATION NOTE
This note was copied from a baby's chart. Discussed with mother her plan for feeding. Reviewed the benefits of exclusive breast milk feeding during the hospital stay. Informed her of the risks of using formula to supplement in the first few days of life as well as the benefits of successful breast milk feeding; referred her to the Breastfeeding booklet about this information. She acknowledges understanding of information reviewed and states that it is her plan to breastfeed her infant. Will support her choice and offer additional information as needed. Reviewed breastfeeding basics:  How milk is made and normal  breastfeeding behaviors discussed. Supply and demand,  stomach size, early feeding cues, skin to skin bonding with comfortable positioning and baby led latch-on reviewed. How to identify signs of successful breastfeeding sessions reviewed; education on assymetrical latch, signs of effective latching vs shallow, in-effective latching, normal  feeding frequency and duration and expected infant output discussed. Normal course of breastfeeding discussed including the AAP's recommendation that children receive exclusive breast milk feedings for the first six months of life with breast milk feedings to continue through the first year of life and/or beyond as complimentary table foods are added. Breastfeeding Booklet and Warm line information provided with discussion. Discussed typical  weight loss and the importance of pediatrician appointment within 24-48 hours of discharge, at 2 weeks of life and normalcy of requesting pediatric weight checks as needed in between visits. Pt will successfully establish breastfeeding by feeding in response to early feeding cues   or wake every 3h, will obtain deep latch, and will keep log of feedings/output. Taught to BF at hunger cues and or q 2-3 hrs and to offer 10-20 drops of hand expressed colostrum at any non-feeds.       Breast Assessment  Left Breast: Medium  Left Nipple: Flat, Intact  Right Breast: Medium  Right Nipple: Flat, Intact  Breast- Feeding Assessment  Attends Breast-Feeding Classes: Yes  Breast-Feeding Experience: Yes  Breast Trauma/Surgery: No  Type/Quality: Good  Lactation Consultant Visits  Breast-Feedings: Good   Mother/Infant Observation  Mother Observation: Alignment, Breast comfortable, Recognizes feeding cues, Close hold, Gush lochia  Infant Observation: Opens mouth, Lips flanged, upper, Lips flanged, lower, Latches nipple and aereolae, Rhythmic suck, Relaxed after feeding  LATCH Documentation  Latch: Repeated attempts, hold nipple in mouth, stimulate to suck  Audible Swallowing: A few with stimulation  Type of Nipple: Flat  Comfort (Breast/Nipple): Soft/non-tender  Hold (Positioning): No assist from staff, mother able to position/hold infant  LATCH Score: 7    Baby feeding well at breast on shield, baby alert.   Good sucking bursts

## 2021-06-21 NOTE — H&P
History & Physical    Name: Alessandra Greenberg MRN: [id number]  SSN: [id number]    YOB: 1980  Age: 47 y.o. Sex: female        Subjective:     Estimated Date of Delivery: 21  OB History        3    Para   2    Term   1       1    AB        Living   2       SAB        TAB        Ectopic        Molar        Multiple        Live Births   2                Ms. Adolph Celeste is admitted with pregnancy at 39w1d for active labor. Prenatal course was complicated by diabetes - gestational. Please see prenatal records for details. Past Medical History:   Diagnosis Date    Abnormal Pap smear     Abnormal pap; Normal bx with colpo    Diabetes ([address]     Hair loss     For last 6 months    HX OTHER MEDICAL     migraines without aura    Hypothyroidism due to Hashimoto's thyroiditis 2017    Kidney disease     Kidney stones; lithrotripsy : Scan clear in 2011     Past Surgical History:   Procedure Laterality Date    HX LITHOTRIPSY      HX WISDOM TEETH EXTRACTION  2002     @  Social History     Socioeconomic History    Marital status:      Spouse name: Alejandro Flannery Number of children: 0    Years of education: Not on file    Highest education level: Not on file   Occupational History    Occupation:      Employer: [employer]   Tobacco Use    Smoking status: Never Smoker    Smokeless tobacco: Never Used   Substance and Sexual Activity    Alcohol use: No    Drug use: No    Sexual activity: Yes     Partners: Male     Birth control/protection: None   Other Topics Concern    Not on file   Social History Narrative    Not on file     Social Determinants of Health     Financial Resource Strain:     Difficulty of Paying Living Expenses:    Food Insecurity:     Worried About Running Out of Food in the Last Year:     Ran Out of Food in the Last Year:    Transportation Needs:     Lack of Transportation (Medical):      Lack of Transportation (Non-Medical):    Physical Activity:     Days of Exercise per Week:     Minutes of Exercise per Session:    Stress:     Feeling of Stress :    Social Connections:     Frequency of Communication with Friends and Family:     Frequency of Social Gatherings with Friends and Family:     Attends Evangelical Services:     Active Member of Clubs or Organizations:     Attends Club or Organization Meetings:     Marital Status:    Intimate Partner Violence:     Fear of Current or Ex-Partner:     Emotionally Abused:     Physically Abused:     Sexually Abused:      Family History   Problem Relation Age of Onset    Diabetes Father     Hypertension Father     Heart Disease Mother     Diabetes Mother 27        Gestational DM; No recurrent DM as of yet    Diabetes Maternal Grandmother     Cancer Maternal Grandmother 80        Breast    Dementia Maternal Grandmother         Sundowners    Diabetes Paternal Grandmother     Thyroid Disease Neg Hx        Allergies   Allergen Reactions    Penicillins Hives     Not all the Penicillins just Penicillin only     Prior to Admission medications    Medication Sig Start Date End Date Taking? Authorizing Provider   metFORMIN (GLUCOPHAGE) 500 mg tablet Take 500 mg by mouth two (2) times daily (with meals). Provider, Historical   OneTouch Verio test strips strip USE TO TEST BLOOD SUGAR 4 TIMES A DAY AS DIRECTED 12/28/20   Provider, Historical   OneTouch Delica Plus Lancet 33 gauge misc USE TO CHECK BLOOD SUGAR 4 TIMES A DAY 12/28/20   Provider, Historical   iron,carbonyl-vitamin C (Vitron-C) 65 mg iron- 125 mg TbEC 1 tablet 1/20/20   Provider, Historical   levothyroxine (SYNTHROID) 75 mcg tablet Take 1 Tab by mouth Daily (before breakfast). 2/21/17   Walker Mariano MD   cholecalciferol, vitamin D3, (VITAMIN D3) 2,000 unit tab Take  by mouth. Provider, Historical   ferrous sulfate 325 mg (65 mg iron) cpER Take  by mouth. Provider, Historical   Biotin 2,500 mcg cap Take  by mouth.     Provider, Historical   prenatal vit-iron fumarate-fa (PRENATAL VITAMIN) 27-0.8 mg Tab tablet Take 1 Tab by mouth daily. Provider, Historical        Review of Systems: A comprehensive review of systems was negative except for that written in the HPI. Objective:     Vitals: There were no vitals filed for this visit. Physical Exam:  Chest: clear  Heart: RRR  Abdomen: Gravid  Cervix 60/3/-2/vtx  Membranes:  Spontaneous Rupture of Membranes; Amniotic Fluid: clear fluid  Fetal Heart Rate: Reactive    Prenatal Labs:   Lab Results   Component Value Date/Time    Rubella, External immune 09/18/2013 12:00 AM    GrBStrep, External Negative 06/03/2012 12:00 AM    HBsAg, External Negative 09/18/2013 12:00 AM    HIV, External Non-Reactive 09/18/2013 12:00 AM    RPR, External Non reactive 11/30/2011 12:00 AM    Gonorrhea, External Negative 09/18/2013 12:00 AM    Chlamydia, External Negative 09/18/2013 12:00 AM        Assessment/Plan:     Plan: Admit for active labor. See prenatal labs.     Signed By:  Ki Del Toro MD     June 21, 2021

## 2021-06-22 PROCEDURE — 74011250637 HC RX REV CODE- 250/637: Performed by: OBSTETRICS & GYNECOLOGY

## 2021-06-22 PROCEDURE — 2709999900 HC NON-CHARGEABLE SUPPLY

## 2021-06-22 PROCEDURE — 65270000029 HC RM PRIVATE

## 2021-06-22 RX ADMIN — HYDROCODONE BITARTRATE AND ACETAMINOPHEN 1 TABLET: 7.5; 325 TABLET ORAL at 20:25

## 2021-06-22 RX ADMIN — IBUPROFEN 800 MG: 800 TABLET, FILM COATED ORAL at 22:43

## 2021-06-22 RX ADMIN — IBUPROFEN 800 MG: 800 TABLET, FILM COATED ORAL at 10:24

## 2021-06-22 RX ADMIN — IBUPROFEN 800 MG: 800 TABLET, FILM COATED ORAL at 16:15

## 2021-06-22 RX ADMIN — IBUPROFEN 800 MG: 800 TABLET, FILM COATED ORAL at 04:06

## 2021-06-22 NOTE — LACTATION NOTE
This note was copied from a baby's chart. Mother sitting up in bed - family visiting in room. Baby in nursery having circumcision done. Mother plans to order a breast pump through [facility]. Mother states baby has been latching on and breastfeeding well. Baby last breast fed at 0900. Reviewed breastfeeding basics:  Supply and demand, breastfeed baby 8-12 times in 24 hr, feed baby on demand,   stomach size, early  Feeding cues, skin to skin, positioning and baby led latch-on, assymetrical latch with signs of good, deep latch vs shallow, feeding frequency and duration, and log sheet for tracking infant feedings and output. Breastfeeding Booklet and Warm line information given. Discussed typical  weight loss and the importance of infant weight checks with pediatrician 1-2 post discharge. Discussed eating a healthy diet. Instructed mother to eat a variety of foods in order to get a well balanced diet. She should consume an extra 500 calories per day (more than her non-pregnant requirement.) These extra calories will help provide energy needed for optimal breast milk production. Mother also encouraged to \"drink to thirst\" and it is recommended that she drink fluids such as water, fruit/vegetable juice. Nutritious snacks should be available so that she can eat throughout the day to help satisfy her hunger and maintain a good milk supply. Mother will successfully establish breastfeeding by feeding in response to early feeding cues   or wake every 3h, will obtain deep latch, and will keep log of feedings/output. Taught to BF at hunger cues and or q 2-3 hrs and to offer 10-20 drops of hand expressed colostrum at any non-feeds.       Breast Assessment  Left Breast: Medium  Left Nipple: Flat, Intact  Right Breast: Medium  Right Nipple: Flat, Intact  Breast- Feeding Assessment  Attends Breast-Feeding Classes: Yes  Breast-Feeding Experience: Yes  Breast Trauma/Surgery: No  Type/Quality: Good (Per mother)  Lactation Consultant Visits  Breast-Feedings:  (Baby having circumcision done. Mother states baby last breast fed at 0900 for 45 min.  Instructed mother to call [facility] for breastfeeding assistance.)

## 2021-06-23 VITALS
RESPIRATION RATE: 17 BRPM | OXYGEN SATURATION: 97 % | DIASTOLIC BLOOD PRESSURE: 79 MMHG | TEMPERATURE: 98 F | HEART RATE: 64 BPM | SYSTOLIC BLOOD PRESSURE: 119 MMHG

## 2021-06-23 PROCEDURE — 77030021125

## 2021-06-23 PROCEDURE — 2709999900 HC NON-CHARGEABLE SUPPLY

## 2021-06-23 PROCEDURE — 74011250637 HC RX REV CODE- 250/637: Performed by: OBSTETRICS & GYNECOLOGY

## 2021-06-23 RX ORDER — HYDROCODONE BITARTRATE AND ACETAMINOPHEN 7.5; 325 MG/1; MG/1
1 TABLET ORAL
Qty: 10 TABLET | Refills: 0 | Status: SHIPPED | OUTPATIENT
Start: 2021-06-23 | End: 2021-06-28

## 2021-06-23 RX ORDER — IBUPROFEN 800 MG/1
800 TABLET ORAL
Qty: 20 TABLET | Refills: 1 | Status: SHIPPED | OUTPATIENT
Start: 2021-06-23

## 2021-06-23 RX ADMIN — IBUPROFEN 800 MG: 800 TABLET, FILM COATED ORAL at 10:01

## 2021-06-23 RX ADMIN — IBUPROFEN 800 MG: 800 TABLET, FILM COATED ORAL at 04:47

## 2021-06-23 RX ADMIN — HYDROCODONE BITARTRATE AND ACETAMINOPHEN 1 TABLET: 7.5; 325 TABLET ORAL at 05:59

## 2021-06-23 RX ADMIN — HYDROCODONE BITARTRATE AND ACETAMINOPHEN 1 TABLET: 7.5; 325 TABLET ORAL at 10:01

## 2021-06-23 NOTE — DISCHARGE INSTRUCTIONS
[address] [facility]  Name: Dharmesh Deshpande  YOB: 1980      General:     Diet/Diet Restrictions:  Try to drink eight 8-ounce glasses of fluid daily (water, juices); avoid excessive caffeine intake. Meals/snacks as desired which are high in fiber and carbohydrates and low in fat and cholesterol. Medications:         Physical Activity / Restrictions / Safety:     Avoid heavy lifting, no more that 10 lbs. for 2-3 weeks; No driving while taking narcotic pain medication. Post  patients should not drive until pain free. No intercourse for 4-6 weeks if you had a vaginal delivery and 6 weeks if you had a  Section. Always use contraception even if you are breastfeeding. No douching or tampon use. May resume exercise in 6 weeks. Discharge Instructions/Special Treatment/Home Care Needs:     Continue prenatal vitamins. Continue to use squirt bottle with warm water on your episiotomy after each bathroom use until bleeding stops. If you had a  Section and if steri-strips were applied to your incision, remove them in 7 days. If they fall off before then it's okay. If you are sent home with staples still on the incision then call the office to make an appointment within the next week to remove them. Take stool softeners (Colace) daily when you first get home and then as needed for constipation. Take them for 3-4 weeks if you had a large vaginal tear. Call your doctor for the following:     Fever over 101 degrees by mouth. Vaginal bleeding heavier than a normal menstrual period or clots larger than a golf ball. Red streaks or increased swelling of legs, painful red streaks on your breast.  Painful urination, or increased pain, redness or discharge with your incision. Pain Management:     Pain Management:   Take Acetaminophen (Tylenol) or Ibuprofen (Advil, Motrin) as directed for pain.   Take perscribed narcotic meds if over the counter meds are not adequately controlling your pain. Use a warm Sitz bath 3 times daily to relieve episiotomy or hemorrhoidal discomfort. A heating pad applied to your lower abdomen can help relieve  incision pain as well as uterine cramps from delivery. For hemorrhoidal discomfort use Tucks pads and Anusol cream/Preparation H as needed and directed. Follow-Up Care:     Unless instructed otherwise, make an appointment for 6 weeks after delivery.   Telephone number: [phone]    Signed By: Devonte Bryant MD                                                                                                   Date: 2021 Time: 8:35 AM

## 2021-06-23 NOTE — ROUTINE PROCESS
Bedside and Verbal shift change report given to Souleymane Shook RN (oncoming nurse) by Jackie Blackmon RN (offgoing nurse). Report included the following information SBAR, Kardex, Intake/Output and MAR.

## 2021-06-23 NOTE — LACTATION NOTE
This note was copied from a baby's chart. Mother and baby for discharge today. Mother states baby has been latching on and breastfeeding well. She had just finished breastfeeding baby when [facility] came to visit. Her breast milk came in today. Baby has a pediatric appointment tomorrow. LC discussed the following:    Reviewed breastfeeding basics:  Supply and demand, breastfeed baby 8-12 times in 24 hr., feed baby on demand,   stomach size, early  Feeding cues, skin to skin, positioning and baby led latch-on, assymetrical latch with signs of good, deep latch vs shallow, feeding frequency and duration, and log sheet for tracking infant feedings and output. Breastfeeding Booklet and Warm line information given. Discussed typical  weight loss and the importance of infant weight checks with pediatrician 1-2 post discharge. Engorgement Care Guidelines:  Reviewed how milk is made and normal phases of milk production. Taught care of engorged breasts - frequent breastfeeding encouraged, cool packs and motrin as tolerated. Anticipatory guidance shared. Care for sore/tender nipples discussed:  ways to improve positioning and latch practiced and discussed, hand express colostrum after feedings and let air dry, light application of lanolin, hydrogel pads, seek comfortable laid back feeding position, start feedings on least sore side first.    Discussed eating a healthy diet. Instructed mother to eat a variety of foods in order to get a well balanced diet. She should consume an extra 500 calories per day (more than her non-pregnant requirement.) These extra calories will help provide energy needed for optimal breast milk production. Mother also encouraged to \"drink to thirst\" and it is recommended that she drink fluids such as water, fruit/vegetable juice. Nutritious snacks should be available so that she can eat throughout the day to help satisfy her hunger and maintain a good milk supply.     Reviewed pumping/storage and preparation of expressed breast milk for baby. Mother will successfully establish breastfeeding by feeding in response to early feeding cues   or wake every 3h, will obtain deep latch, and will keep log of feedings/output. Taught to BF at hunger cues and or q 2-3 hrs and to offer 10-20 drops of hand expressed colostrum at any non-feeds. Breast Assessment  Left Breast: Medium  Left Nipple: Flat, Intact  Right Breast: Medium  Right Nipple: Flat, Intact  Breast- Feeding Assessment  Attends Breast-Feeding Classes: Yes  Breast-Feeding Experience: Yes  Breast Trauma/Surgery: No  Type/Quality: Good (Mother states baby breast fed well yesterday, last night and this morning.)  Lactation Consultant Visits  Breast-Feedings:  (Mother/baby getting ready for discharge. Mother states baby last nursed at 0900 for 20 min. on right breast with nipple shield. Instructed mother to call [phone] if baby feeds again before D/C for assistance.)    Chart shows numerous feedings, void, stool WNL. Discussed importance of monitoring outputs and feedings on first week of life. Discussed ways to tell if baby is  getting enough breast milk, ie  voids and stools, change in color of stool, and return to birth wt within 2 weeks. Follow up with pediatrician visit for weight check in 1-2 days (per AAP guidelines.)  Encouraged to call Warm Line  [phone]  for any questions/problems that arise.  Mother also given breastfeeding support group dates and times for any future needs

## 2021-06-23 NOTE — PROGRESS NOTES
S/ voiding without problem, no complaints    O/ VSS AF        Abdomen non tender, fundus firm      A/ S/P vaginal delivery, stable    P/  Routine care        Discharge home with routine instructions        Norco and motrin prn

## 2021-06-23 NOTE — DISCHARGE SUMMARY
Obstetrical Discharge Summary     Name: Modesta Arroyo MRN: [id number]  SSN: [id number]    YOB: 1980  Age: 47 y.o. Sex: female      Admit Date: 2021    Discharge Date: 2021     Admitting Physician: Yenny Luz MD     Attending Physician:  Caterina Jiménez MD     Admission Diagnoses: Pregnant and not yet delivered [Z34.90]; Pregnancy [Z34.90]    Discharge Diagnoses:   Information for the patient's :  [place] Male Vicente Manning [[id number]]   Delivery of a 6 lb 12.8 oz (3.085 kg) male infant via Vaginal, Spontaneous on 2021 at 1:35 PM  by Yenny Luz. Apgars were 9  and 9 . Additional Diagnoses:   Hospital Problems  Date Reviewed: 2021        Codes Class Noted POA    Pregnant and not yet delivered ICD-10-CM: Z34.90  ICD-9-CM: V22.1  2021 Unknown        Pregnancy ICD-10-CM: Z34.90  ICD-9-CM: V22.2  2020 Unknown    Overview Addendum 2021  9:19 AM by Caterina Jiménez MD     Use LMP for Socorro Ernst MD  H/O GDM-followed by Clemente Hung MD-diet and Metformin 500 mg BID  H/O PROM and PTD at 34 weeks  AMA  Pap LGSIL with negative HPV-needs colpo post partum  CF carrier-FOB negative  Panorama normal- no gender per pt request  Renal stones  AFP negative  Tdap 21  IOL H[facility]ctor@Nov[facility]  GBS negative  Covid neg 2021                  Lab Results   Component Value Date/Time    Rubella, External immune 2013 12:00 AM    GrBStrep, External Negative 2012 12:00 AM       Hospital Course: Normal hospital course following the delivery. Condition on discharge: stable  Disposition: Home  Patient Instructions:   Current Discharge Medication List      START taking these medications    Details   ibuprofen (MOTRIN) 800 mg tablet Take 1 Tablet by mouth every eight (8) hours as needed for Pain.   Qty: 20 Tablet, Refills: 1      HYDROcodone-acetaminophen (NORCO) 7.5-325 mg per tablet Take 1 Tablet by mouth every eight (8) hours as needed for Pain for up to 5 days. Max Daily Amount: 3 Tablets. Qty: 10 Tablet, Refills: 0    Associated Diagnoses: Postpartum pain         CONTINUE these medications which have NOT CHANGED    Details   iron,carbonyl-vitamin C (Vitron-C) 65 mg iron- 125 mg TbEC 1 tablet      levothyroxine (SYNTHROID) 75 mcg tablet Take 1 Tab by mouth Daily (before breakfast). Qty: 30 Tab, Refills: 4      cholecalciferol, vitamin D3, (VITAMIN D3) 2,000 unit tab Take  by mouth. ferrous sulfate 325 mg (65 mg iron) cpER Take  by mouth. Biotin 2,500 mcg cap Take  by mouth.      prenatal vit-iron fumarate-fa (PRENATAL VITAMIN) 27-0.8 mg Tab tablet Take 1 Tab by mouth daily. STOP taking these medications       metFORMIN (GLUCOPHAGE) 500 mg tablet Comments:   Reason for Stopping:         OneTouch Verio test strips strip Comments:   Reason for Stopping:         OneTouch Delica Plus Lancet 33 gauge misc Comments:   Reason for Stopping:               Reference my discharge instructions.     Follow-up Appointments   Procedures    FOLLOW UP VISIT Appointment in: 6 Weeks     Standing Status:   Standing     Number of Occurrences:   1     Order Specific Question:   Appointment in     Answer:   6 Weeks        Signed By:  Caro Ambrosio MD     June 23, 2021

## 2021-07-27 NOTE — PROGRESS NOTES
Postpartum evaluation    Lele Limon is a 46 y.o. female who presents for a postpartum exam.     She is now six weeks post normal spontaneous vaginal delivery. Her baby is doing well. She has had no menses since delivery. She has had the following significant problems since her delivery: none    The patient is breast feeding without difficulty. The patient is unsure what method of birth control. Wants to discuss options as she's never been on birth control before. She is currently taking: no medications. She is due for her next AE in 3 months. Her Pap in early pregnancy was LGSIL and neg HPV.         Visit Vitals  /62   Wt 121 lb (54.9 kg)   BMI 21.43 kg/m²       PHYSICAL EXAMINATION    Constitutional  · Appearance: well-nourished, well developed, alert, in no acute distress    HENT  · Head and Face: appears normal    Neck  · Inspection/Palpation: normal appearance, no masses or tenderness  · Lymph Nodes: no lymphadenopathy present  · Thyroid: gland size normal, nontender, no nodules or masses present on palpation    ·     Gastrointestinal  · Abdominal Examination: abdomen non-tender to palpation, normal bowel sounds, no masses present  · Liver and spleen: no hepatomegaly present, spleen not palpable  · Hernias: no hernias identified    Genitourinary  · External Genitalia: normal appearance for age, no discharge present, no tenderness present, no inflammatory lesions present, no masses present, no atrophy present  · Vagina: normal vaginal vault without central or paravaginal defects, no discharge present, no inflammatory lesions present, no masses present  · Bladder: non-tender to palpation  · Urethra: appears normal  · Cervix: normal   · Uterus: normal size, shape and consistency  · Adnexa: no adnexal tenderness present, no adnexal masses present  · Perineum: perineum within normal limits, no evidence of trauma, no rashes or skin lesions present  · Anus: anus within normal limits, no hemorrhoids present  · Inguinal Lymph Nodes: no lymphadenopathy present    Skin  · General Inspection: no rash, no lesions identified    Neurologic/Psychiatric  · Mental Status:  · Orientation: grossly oriented to person, place and time  · Mood and Affect: mood normal, affect appropriate    Assessment:  Normal postpartum check  H/O abnormal Pap    Plan:  RTO for AE.   Rx for contraception: Micronor  Pap sent

## 2021-07-30 ENCOUNTER — OFFICE VISIT (OUTPATIENT)
Dept: OBGYN CLINIC | Age: 41
End: 2021-07-30
Payer: COMMERCIAL

## 2021-07-30 VITALS — DIASTOLIC BLOOD PRESSURE: 62 MMHG | WEIGHT: 121 LBS | BODY MASS INDEX: 21.43 KG/M2 | SYSTOLIC BLOOD PRESSURE: 124 MMHG

## 2021-07-30 DIAGNOSIS — R87.612 LGSIL OF CERVIX OF UNDETERMINED SIGNIFICANCE: ICD-10-CM

## 2021-07-30 PROBLEM — Z34.90 PREGNANCY: Status: RESOLVED | Noted: 2020-11-18 | Resolved: 2021-07-30

## 2021-07-30 PROCEDURE — 0503F POSTPARTUM CARE VISIT: CPT | Performed by: OBSTETRICS & GYNECOLOGY

## 2021-07-30 RX ORDER — ACETAMINOPHEN AND CODEINE PHOSPHATE 120; 12 MG/5ML; MG/5ML
1 SOLUTION ORAL DAILY
Qty: 3 DOSE PACK | Refills: 1 | Status: SHIPPED | OUTPATIENT
Start: 2021-07-30

## 2021-07-30 NOTE — PATIENT INSTRUCTIONS
Postpartum: Care Instructions  Your Care Instructions  After childbirth (postpartum period), your body goes through many changes. Some of these changes happen over several weeks. In the hours after delivery, your body will begin to recover from childbirth while it prepares to breastfeed your . You may feel emotional during this time. Your hormones can shift your mood without warning for no clear reason. In the first couple of weeks after childbirth, many women have emotions that change from happy to sad. You may find it hard to sleep. You may cry a lot. This is called the \"baby blues. \" These overwhelming emotions often go away within a couple of days or weeks. But it's important to discuss your feelings with your doctor. It is easy to get too tired and overwhelmed during the first weeks after childbirth. Don't try to do too much. Get rest whenever you can, accept help from others, and eat well and drink plenty of fluids. In the first couple of weeks after giving birth, your doctor or midwife may want to check in with you and make a plan for any follow-up care you may need. You will likely have a complete postpartum visit in the first 3 months after delivery. At that time, your doctor or midwife will check on your recovery from childbirth. He or she will also see how you are doing with your emotions and talk about your concerns or questions. Follow-up care is a key part of your treatment and safety. Be sure to make and go to all appointments, and call your doctor if you are having problems. It's also a good idea to know your test results and keep a list of the medicines you take. How can you care for yourself at home? · Sleep or rest when your baby sleeps. · Get help with household chores from family or friends, if you can. Do not try to do it all yourself. · If you have hemorrhoids or swelling or pain around the opening of your vagina, try using cold and heat.  You can put ice or a cold pack on the area for 10 to 20 minutes at a time. Put a thin cloth between the ice and your skin. Also try sitting in a few inches of warm water (sitz bath) 3 times a day and after bowel movements. · Take pain medicines exactly as directed. ? If the doctor gave you a prescription medicine for pain, take it as prescribed. ? If you are not taking a prescription pain medicine, ask your doctor if you can take an over-the-counter medicine. · Eat more fiber to avoid constipation. Include foods such as whole-grain breads and cereals, raw vegetables, raw and dried fruits, and beans. · Drink plenty of fluids. If you have kidney, heart, or liver disease and have to limit fluids, talk with your doctor before you increase the amount of fluids you drink. · Do not rinse inside your vagina with fluids (douche). · If you have stitches, keep the area clean by pouring or spraying warm water over the area outside your vagina and anus after you use the toilet. · Keep a list of questions to ask your doctor or midwife. Your questions might be about:  ? Changes in your breasts, such as lumps or soreness. ? When to expect your menstrual period to start again. ? What form of birth control is best for you. ? Weight you have put on during the pregnancy. ? Exercise options. ? What foods and drinks are best for you, especially if you are breastfeeding. ? Problems you might be having with breastfeeding. ? When you can have sex. Some women may want to talk about lubricants for the vagina. ? Any feelings of sadness or restlessness that you are having. When should you call for help? Call 911 anytime you think you may need emergency care. For example, call if:    · You have thoughts of harming yourself, your baby, or another person.     · You passed out (lost consciousness).     · You have chest pain, are short of breath, or cough up blood.     · You have a seizure.    Call your doctor now or seek immediate medical care if:    · Your vaginal bleeding seems to be getting heavier.     · You are dizzy or lightheaded, or you feel like you may faint.     · You have a fever.     · You have new or more belly pain.     · You have symptoms of a blood clot in your leg (called a deep vein thrombosis), such as:  ? Pain in the calf, back of the knee, thigh, or groin. ? Redness and swelling in your leg or groin.     · You have signs of preeclampsia, such as:  ? Sudden swelling of your face, hands, or feet. ? New vision problems (such as dimness, blurring, or seeing spots). ? A severe headache. Watch closely for changes in your health, and be sure to contact your doctor if:    · You have new or worse vaginal discharge.     · You feel sad or depressed.     · You are having problems with your breasts or breastfeeding. Where can you learn more? Go to http://www.gray.com/  Enter C679 in the search box to learn more about \"Postpartum: Care Instructions. \"  Current as of: October 8, 2020               Content Version: 12.8  © 2006-2021 [employer]. Care instructions adapted under license by [employer] (which disclaims liability or warranty for this information). If you have questions about a medical condition or this instruction, always ask your healthcare professional. [address] any warranty or liability for your use of this information.

## 2021-08-05 LAB
CYTOLOGIST CVX/VAG CYTO: NORMAL
CYTOLOGY CVX/VAG DOC CYTO: NORMAL
CYTOLOGY CVX/VAG DOC THIN PREP: NORMAL
CYTOLOGY HISTORY:: NORMAL
DX ICD CODE: NORMAL
HPV I/H RISK 4 DNA CVX QL PROBE+SIG AMP: NEGATIVE
Lab: NORMAL
Lab: NORMAL
OTHER STN SPEC: NORMAL
STAT OF ADQ CVX/VAG CYTO-IMP: NORMAL

## 2022-03-18 PROBLEM — E03.8 HYPOTHYROIDISM DUE TO HASHIMOTO'S THYROIDITIS: Status: ACTIVE | Noted: 2017-02-21

## 2022-03-18 PROBLEM — Z34.90 PREGNANT AND NOT YET DELIVERED: Status: ACTIVE | Noted: 2021-06-21

## 2022-03-18 PROBLEM — E06.3 HYPOTHYROIDISM DUE TO HASHIMOTO'S THYROIDITIS: Status: ACTIVE | Noted: 2017-02-21
